# Patient Record
Sex: FEMALE | Race: WHITE | NOT HISPANIC OR LATINO | Employment: UNEMPLOYED | URBAN - METROPOLITAN AREA
[De-identification: names, ages, dates, MRNs, and addresses within clinical notes are randomized per-mention and may not be internally consistent; named-entity substitution may affect disease eponyms.]

---

## 2021-11-01 ENCOUNTER — OFFICE VISIT (OUTPATIENT)
Dept: INTERNAL MEDICINE | Facility: CLINIC | Age: 41
End: 2021-11-01
Payer: COMMERCIAL

## 2021-11-01 VITALS
SYSTOLIC BLOOD PRESSURE: 124 MMHG | TEMPERATURE: 98 F | OXYGEN SATURATION: 99 % | BODY MASS INDEX: 43.98 KG/M2 | HEIGHT: 62 IN | HEART RATE: 103 BPM | DIASTOLIC BLOOD PRESSURE: 78 MMHG | WEIGHT: 239 LBS

## 2021-11-01 DIAGNOSIS — Z13.220 SCREENING FOR LIPID DISORDERS: ICD-10-CM

## 2021-11-01 DIAGNOSIS — Z11.4 SCREENING FOR HIV WITHOUT PRESENCE OF RISK FACTORS: ICD-10-CM

## 2021-11-01 DIAGNOSIS — E88.819 INSULIN RESISTANCE: ICD-10-CM

## 2021-11-01 DIAGNOSIS — G47.33 OBSTRUCTIVE SLEEP APNEA SYNDROME: ICD-10-CM

## 2021-11-01 DIAGNOSIS — Z11.59 ENCOUNTER FOR HEPATITIS C SCREENING TEST FOR LOW RISK PATIENT: ICD-10-CM

## 2021-11-01 DIAGNOSIS — E66.01 CLASS 3 SEVERE OBESITY DUE TO EXCESS CALORIES WITH SERIOUS COMORBIDITY AND BODY MASS INDEX (BMI) OF 40.0 TO 44.9 IN ADULT: ICD-10-CM

## 2021-11-01 DIAGNOSIS — Z20.822 SUSPECTED COVID-19 VIRUS INFECTION: ICD-10-CM

## 2021-11-01 DIAGNOSIS — Z00.01 ENCOUNTER FOR WELL ADULT EXAM WITH ABNORMAL FINDINGS: Primary | ICD-10-CM

## 2021-11-01 DIAGNOSIS — Z13.1 SCREENING FOR DIABETES MELLITUS: ICD-10-CM

## 2021-11-01 DIAGNOSIS — R05.9 COUGH: ICD-10-CM

## 2021-11-01 PROBLEM — G47.10 HYPERSOMNIA: Status: ACTIVE | Noted: 2021-11-01

## 2021-11-01 LAB
CTP QC/QA: YES
SARS-COV-2 RDRP RESP QL NAA+PROBE: NEGATIVE

## 2021-11-01 PROCEDURE — 99999 PR PBB SHADOW E&M-NEW PATIENT-LVL III: CPT | Mod: PBBFAC,,, | Performed by: FAMILY MEDICINE

## 2021-11-01 PROCEDURE — U0002 COVID-19 LAB TEST NON-CDC: HCPCS | Mod: QW,S$GLB,, | Performed by: FAMILY MEDICINE

## 2021-11-01 PROCEDURE — 3074F PR MOST RECENT SYSTOLIC BLOOD PRESSURE < 130 MM HG: ICD-10-PCS | Mod: CPTII,S$GLB,, | Performed by: FAMILY MEDICINE

## 2021-11-01 PROCEDURE — 99386 PREV VISIT NEW AGE 40-64: CPT | Mod: S$GLB,,, | Performed by: FAMILY MEDICINE

## 2021-11-01 PROCEDURE — 1160F PR REVIEW ALL MEDS BY PRESCRIBER/CLIN PHARMACIST DOCUMENTED: ICD-10-PCS | Mod: CPTII,S$GLB,, | Performed by: FAMILY MEDICINE

## 2021-11-01 PROCEDURE — 3008F BODY MASS INDEX DOCD: CPT | Mod: CPTII,S$GLB,, | Performed by: FAMILY MEDICINE

## 2021-11-01 PROCEDURE — 99386 PR PREVENTIVE VISIT,NEW,40-64: ICD-10-PCS | Mod: S$GLB,,, | Performed by: FAMILY MEDICINE

## 2021-11-01 PROCEDURE — 99999 PR PBB SHADOW E&M-NEW PATIENT-LVL III: ICD-10-PCS | Mod: PBBFAC,,, | Performed by: FAMILY MEDICINE

## 2021-11-01 PROCEDURE — 1159F PR MEDICATION LIST DOCUMENTED IN MEDICAL RECORD: ICD-10-PCS | Mod: CPTII,S$GLB,, | Performed by: FAMILY MEDICINE

## 2021-11-01 PROCEDURE — 3074F SYST BP LT 130 MM HG: CPT | Mod: CPTII,S$GLB,, | Performed by: FAMILY MEDICINE

## 2021-11-01 PROCEDURE — 3078F PR MOST RECENT DIASTOLIC BLOOD PRESSURE < 80 MM HG: ICD-10-PCS | Mod: CPTII,S$GLB,, | Performed by: FAMILY MEDICINE

## 2021-11-01 PROCEDURE — 1160F RVW MEDS BY RX/DR IN RCRD: CPT | Mod: CPTII,S$GLB,, | Performed by: FAMILY MEDICINE

## 2021-11-01 PROCEDURE — 3008F PR BODY MASS INDEX (BMI) DOCUMENTED: ICD-10-PCS | Mod: CPTII,S$GLB,, | Performed by: FAMILY MEDICINE

## 2021-11-01 PROCEDURE — U0002: ICD-10-PCS | Mod: QW,S$GLB,, | Performed by: FAMILY MEDICINE

## 2021-11-01 PROCEDURE — 1159F MED LIST DOCD IN RCRD: CPT | Mod: CPTII,S$GLB,, | Performed by: FAMILY MEDICINE

## 2021-11-01 PROCEDURE — 3078F DIAST BP <80 MM HG: CPT | Mod: CPTII,S$GLB,, | Performed by: FAMILY MEDICINE

## 2021-11-01 RX ORDER — DIAZEPAM 2 MG/1
2 TABLET ORAL EVERY 8 HOURS PRN
COMMUNITY
Start: 2021-06-29 | End: 2022-08-02 | Stop reason: ALTCHOICE

## 2021-11-01 RX ORDER — MULTIVITAMIN
1 TABLET ORAL DAILY
COMMUNITY

## 2021-11-03 ENCOUNTER — TELEPHONE (OUTPATIENT)
Dept: PULMONOLOGY | Facility: CLINIC | Age: 41
End: 2021-11-03
Payer: COMMERCIAL

## 2021-11-10 ENCOUNTER — PATIENT MESSAGE (OUTPATIENT)
Dept: INTERNAL MEDICINE | Facility: CLINIC | Age: 41
End: 2021-11-10
Payer: COMMERCIAL

## 2021-11-11 ENCOUNTER — LAB VISIT (OUTPATIENT)
Dept: LAB | Facility: HOSPITAL | Age: 41
End: 2021-11-11
Attending: FAMILY MEDICINE
Payer: COMMERCIAL

## 2021-11-11 DIAGNOSIS — Z11.59 ENCOUNTER FOR HEPATITIS C SCREENING TEST FOR LOW RISK PATIENT: ICD-10-CM

## 2021-11-11 DIAGNOSIS — Z13.220 SCREENING FOR LIPID DISORDERS: ICD-10-CM

## 2021-11-11 DIAGNOSIS — Z00.01 ENCOUNTER FOR WELL ADULT EXAM WITH ABNORMAL FINDINGS: ICD-10-CM

## 2021-11-11 DIAGNOSIS — Z13.1 SCREENING FOR DIABETES MELLITUS: ICD-10-CM

## 2021-11-11 DIAGNOSIS — Z11.4 SCREENING FOR HIV WITHOUT PRESENCE OF RISK FACTORS: ICD-10-CM

## 2021-11-11 DIAGNOSIS — E66.01 CLASS 3 SEVERE OBESITY DUE TO EXCESS CALORIES WITH SERIOUS COMORBIDITY AND BODY MASS INDEX (BMI) OF 40.0 TO 44.9 IN ADULT: ICD-10-CM

## 2021-11-11 DIAGNOSIS — E88.819 INSULIN RESISTANCE: ICD-10-CM

## 2021-11-11 LAB
25(OH)D3+25(OH)D2 SERPL-MCNC: 21 NG/ML (ref 30–96)
ALBUMIN SERPL BCP-MCNC: 3.8 G/DL (ref 3.5–5.2)
ALP SERPL-CCNC: 70 U/L (ref 55–135)
ALT SERPL W/O P-5'-P-CCNC: 15 U/L (ref 10–44)
ANION GAP SERPL CALC-SCNC: 7 MMOL/L (ref 8–16)
AST SERPL-CCNC: 15 U/L (ref 10–40)
BILIRUB SERPL-MCNC: 0.6 MG/DL (ref 0.1–1)
BUN SERPL-MCNC: 9 MG/DL (ref 6–20)
CALCIUM SERPL-MCNC: 9.4 MG/DL (ref 8.7–10.5)
CHLORIDE SERPL-SCNC: 104 MMOL/L (ref 95–110)
CHOLEST SERPL-MCNC: 152 MG/DL (ref 120–199)
CHOLEST/HDLC SERPL: 4 {RATIO} (ref 2–5)
CO2 SERPL-SCNC: 26 MMOL/L (ref 23–29)
CREAT SERPL-MCNC: 0.7 MG/DL (ref 0.5–1.4)
ERYTHROCYTE [DISTWIDTH] IN BLOOD BY AUTOMATED COUNT: 12.4 % (ref 11.5–14.5)
EST. GFR  (AFRICAN AMERICAN): >60 ML/MIN/1.73 M^2
EST. GFR  (NON AFRICAN AMERICAN): >60 ML/MIN/1.73 M^2
ESTIMATED AVG GLUCOSE: 108 MG/DL (ref 68–131)
GLUCOSE SERPL-MCNC: 95 MG/DL (ref 70–110)
HBA1C MFR BLD: 5.4 % (ref 4–5.6)
HCT VFR BLD AUTO: 39.6 % (ref 37–48.5)
HDLC SERPL-MCNC: 38 MG/DL (ref 40–75)
HDLC SERPL: 25 % (ref 20–50)
HGB BLD-MCNC: 13 G/DL (ref 12–16)
LDLC SERPL CALC-MCNC: 101.2 MG/DL (ref 63–159)
MCH RBC QN AUTO: 30.2 PG (ref 27–31)
MCHC RBC AUTO-ENTMCNC: 32.8 G/DL (ref 32–36)
MCV RBC AUTO: 92 FL (ref 82–98)
NONHDLC SERPL-MCNC: 114 MG/DL
PLATELET # BLD AUTO: 282 K/UL (ref 150–450)
PMV BLD AUTO: 9.2 FL (ref 9.2–12.9)
POTASSIUM SERPL-SCNC: 3.9 MMOL/L (ref 3.5–5.1)
PROT SERPL-MCNC: 7.4 G/DL (ref 6–8.4)
RBC # BLD AUTO: 4.3 M/UL (ref 4–5.4)
SODIUM SERPL-SCNC: 137 MMOL/L (ref 136–145)
TRIGL SERPL-MCNC: 64 MG/DL (ref 30–150)
TSH SERPL DL<=0.005 MIU/L-ACNC: 1.98 UIU/ML (ref 0.4–4)
WBC # BLD AUTO: 5.15 K/UL (ref 3.9–12.7)

## 2021-11-11 PROCEDURE — 85027 COMPLETE CBC AUTOMATED: CPT | Performed by: FAMILY MEDICINE

## 2021-11-11 PROCEDURE — 83036 HEMOGLOBIN GLYCOSYLATED A1C: CPT | Performed by: FAMILY MEDICINE

## 2021-11-11 PROCEDURE — 84443 ASSAY THYROID STIM HORMONE: CPT | Performed by: FAMILY MEDICINE

## 2021-11-11 PROCEDURE — 80053 COMPREHEN METABOLIC PANEL: CPT | Performed by: FAMILY MEDICINE

## 2021-11-11 PROCEDURE — 82306 VITAMIN D 25 HYDROXY: CPT | Performed by: FAMILY MEDICINE

## 2021-11-11 PROCEDURE — 87389 HIV-1 AG W/HIV-1&-2 AB AG IA: CPT | Performed by: FAMILY MEDICINE

## 2021-11-11 PROCEDURE — 86803 HEPATITIS C AB TEST: CPT | Performed by: FAMILY MEDICINE

## 2021-11-11 PROCEDURE — 36415 COLL VENOUS BLD VENIPUNCTURE: CPT | Performed by: FAMILY MEDICINE

## 2021-11-11 PROCEDURE — 80061 LIPID PANEL: CPT | Performed by: FAMILY MEDICINE

## 2021-11-12 LAB
HCV AB SERPL QL IA: NEGATIVE
HIV 1+2 AB+HIV1 P24 AG SERPL QL IA: NEGATIVE

## 2021-12-18 PROBLEM — E55.9 VITAMIN D DEFICIENCY: Status: ACTIVE | Noted: 2021-12-18

## 2022-07-20 DIAGNOSIS — Z12.31 OTHER SCREENING MAMMOGRAM: ICD-10-CM

## 2022-08-02 ENCOUNTER — TELEPHONE (OUTPATIENT)
Dept: PULMONOLOGY | Facility: HOSPITAL | Age: 42
End: 2022-08-02
Payer: COMMERCIAL

## 2022-08-02 ENCOUNTER — OFFICE VISIT (OUTPATIENT)
Dept: INTERNAL MEDICINE | Facility: CLINIC | Age: 42
End: 2022-08-02
Payer: COMMERCIAL

## 2022-08-02 VITALS
TEMPERATURE: 98 F | HEIGHT: 62 IN | DIASTOLIC BLOOD PRESSURE: 80 MMHG | OXYGEN SATURATION: 99 % | BODY MASS INDEX: 45.03 KG/M2 | WEIGHT: 244.69 LBS | HEART RATE: 80 BPM | SYSTOLIC BLOOD PRESSURE: 130 MMHG

## 2022-08-02 DIAGNOSIS — G47.33 OBSTRUCTIVE SLEEP APNEA SYNDROME: Primary | Chronic | ICD-10-CM

## 2022-08-02 DIAGNOSIS — E66.01 CLASS 3 SEVERE OBESITY DUE TO EXCESS CALORIES WITH SERIOUS COMORBIDITY AND BODY MASS INDEX (BMI) OF 40.0 TO 44.9 IN ADULT: ICD-10-CM

## 2022-08-02 DIAGNOSIS — E88.819 INSULIN RESISTANCE: ICD-10-CM

## 2022-08-02 DIAGNOSIS — E55.9 VITAMIN D DEFICIENCY: ICD-10-CM

## 2022-08-02 DIAGNOSIS — F51.04 PSYCHOPHYSIOLOGIC INSOMNIA: ICD-10-CM

## 2022-08-02 DIAGNOSIS — F41.9 RECURRENT MILD MAJOR DEPRESSIVE DISORDER WITH ANXIETY: Chronic | ICD-10-CM

## 2022-08-02 DIAGNOSIS — F33.0 RECURRENT MILD MAJOR DEPRESSIVE DISORDER WITH ANXIETY: Chronic | ICD-10-CM

## 2022-08-02 PROCEDURE — 3075F PR MOST RECENT SYSTOLIC BLOOD PRESS GE 130-139MM HG: ICD-10-PCS | Mod: CPTII,S$GLB,, | Performed by: FAMILY MEDICINE

## 2022-08-02 PROCEDURE — 3079F PR MOST RECENT DIASTOLIC BLOOD PRESSURE 80-89 MM HG: ICD-10-PCS | Mod: CPTII,S$GLB,, | Performed by: FAMILY MEDICINE

## 2022-08-02 PROCEDURE — 99214 PR OFFICE/OUTPT VISIT, EST, LEVL IV, 30-39 MIN: ICD-10-PCS | Mod: S$GLB,,, | Performed by: FAMILY MEDICINE

## 2022-08-02 PROCEDURE — 99214 OFFICE O/P EST MOD 30 MIN: CPT | Mod: S$GLB,,, | Performed by: FAMILY MEDICINE

## 2022-08-02 PROCEDURE — 3008F BODY MASS INDEX DOCD: CPT | Mod: CPTII,S$GLB,, | Performed by: FAMILY MEDICINE

## 2022-08-02 PROCEDURE — 99999 PR PBB SHADOW E&M-EST. PATIENT-LVL V: CPT | Mod: PBBFAC,,, | Performed by: FAMILY MEDICINE

## 2022-08-02 PROCEDURE — 3075F SYST BP GE 130 - 139MM HG: CPT | Mod: CPTII,S$GLB,, | Performed by: FAMILY MEDICINE

## 2022-08-02 PROCEDURE — 99999 PR PBB SHADOW E&M-EST. PATIENT-LVL V: ICD-10-PCS | Mod: PBBFAC,,, | Performed by: FAMILY MEDICINE

## 2022-08-02 PROCEDURE — 3008F PR BODY MASS INDEX (BMI) DOCUMENTED: ICD-10-PCS | Mod: CPTII,S$GLB,, | Performed by: FAMILY MEDICINE

## 2022-08-02 PROCEDURE — 3079F DIAST BP 80-89 MM HG: CPT | Mod: CPTII,S$GLB,, | Performed by: FAMILY MEDICINE

## 2022-08-02 RX ORDER — MECLIZINE HYDROCHLORIDE 25 MG/1
25 TABLET ORAL 3 TIMES DAILY PRN
COMMUNITY
Start: 2022-06-27

## 2022-08-02 RX ORDER — BUPROPION HYDROCHLORIDE 150 MG/1
150 TABLET ORAL DAILY
Qty: 30 TABLET | Refills: 2 | Status: SHIPPED | OUTPATIENT
Start: 2022-08-02 | End: 2022-10-27 | Stop reason: DRUGHIGH

## 2022-08-02 RX ORDER — CLONAZEPAM 0.5 MG/1
.25-.5 TABLET ORAL 2 TIMES DAILY PRN
Qty: 45 TABLET | Refills: 2 | Status: SHIPPED | OUTPATIENT
Start: 2022-08-02 | End: 2022-10-27 | Stop reason: SDUPTHER

## 2022-08-02 NOTE — PROGRESS NOTES
OFFICE VISIT 8/2/22 11:00 AM CDT    CHIEF COMPLAINT: Follow-up    DIAGNOSES SPECIFICALLY EVALUATED AND TREATED THIS ENCOUNTER  1. Obstructive sleep apnea syndrome  - Home Sleep Studies; Future    2. Insulin resistance    3. Vitamin D deficiency    4. Recurrent mild major depressive disorder with anxiety  - buPROPion (WELLBUTRIN XL) 150 MG TB24 tablet; Take 1 tablet (150 mg total) by mouth once daily.  Dispense: 30 tablet; Refill: 2  - Ambulatory referral/consult to Psychology; Future  - clonazePAM (KLONOPIN) 0.5 MG tablet; Take 0.5-1 tablets (0.25-0.5 mg total) by mouth 2 (two) times daily as needed (for anxiety or insomnia). MAX 1.5 TABLETS PER DAY  Dispense: 45 tablet; Refill: 2    5. Class 3 (severe) obesity due to excess calories with serious comorbidity and body mass index (BMI) of 40.0 to 44.9 in adult  - Ambulatory referral/consult to [facility]; Future    6. Psychophysiologic insomnia  - Ambulatory referral/consult to Psychology; Future  - clonazePAM (KLONOPIN) 0.5 MG tablet; Take 0.5-1 tablets (0.25-0.5 mg total) by mouth 2 (two) times daily as needed (for anxiety or insomnia). MAX 1.5 TABLETS PER DAY  Dispense: 45 tablet; Refill: 2     --------------------------------------------------------------------------------   BRANDON FALK (MRN [id number], APPT: 8/2/22 11:00 AM CDT)  --------------------------------  Ready to check out. See orders.  Schedule virtual visit in mid-late September.  Schedule in-office appointment in November.  Highlight patient instructions on AVS.  Thanks.  --------------------------------------------------------------------------------     Follow up in about 2 months (around 10/2/2022) for re-evaluate problem(s) discussed today.     Problem List Items Addressed This Visit       Class 3 (severe) obesity due to excess calories with serious comorbidity and body mass index (BMI) of 40.0 to 44.9 in adult (Chronic)    Relevant Orders    Ambulatory referral/consult to  [facility]    Insulin resistance (Chronic)    Current Assessment & Plan     Lab Results   Component Value Date    HGBA1C 5.4 11/11/2021    GLU 95 11/11/2021   No results found for: GLUCFAST, NHHB9VI, LWHL8KM  No results found for: LABINSU          Recurrent mild major depressive disorder with anxiety (Chronic)    Relevant Medications    buPROPion (WELLBUTRIN XL) 150 MG TB24 tablet    clonazePAM (KLONOPIN) 0.5 MG tablet    Other Relevant Orders    Ambulatory referral/consult to Psychology    Vitamin D deficiency (Chronic)    Current Assessment & Plan     Lab Results   Component Value Date    MDBRQOEK57VQ 21 (L) 11/11/2021          MANJINDER (obstructive sleep apnea) - Primary    Current Assessment & Plan     Hasn't gotten HST. She reports it is cost-prohibitive.          Other Visit Diagnoses       Psychophysiologic insomnia        Relevant Medications    clonazePAM (KLONOPIN) 0.5 MG tablet    Other Relevant Orders    Ambulatory referral/consult to Psychology        Unless noted herein, any chronic conditions are represented as and appear compensated/controlled and stable, and no other significant complaints or concerns were reported.    PRESCRIPTION DRUG MANAGEMENT  Outpatient Medications Prior to Visit   Medication Sig Dispense Refill    meclizine (ANTIVERT) 25 mg tablet Take 25 mg by mouth 3 (three) times daily as needed.      multivitamin (THERAGRAN) per tablet Take 1 tablet by mouth once daily.      diazePAM (VALIUM) 2 MG tablet Take 2 mg by mouth every 8 (eight) hours as needed.       No facility-administered medications prior to visit.     Medications Discontinued During This Encounter   Medication Reason    diazePAM (VALIUM) 2 MG tablet Alternate therapy     Medications Ordered This Encounter   Medications    buPROPion (WELLBUTRIN XL) 150 MG TB24 tablet     Sig: Take 1 tablet (150 mg total) by mouth once daily.     Dispense:  30 tablet     Refill:  2    clonazePAM (KLONOPIN) 0.5 MG tablet     Sig:  "Take 0.5-1 tablets (0.25-0.5 mg total) by mouth 2 (two) times daily as needed (for anxiety or insomnia). MAX 1.5 TABLETS PER DAY     Dispense:  45 tablet     Refill:  2     PHYSICAL EXAM  Vitals:    08/02/22 1128   BP: 130/80   BP Location: Left arm   Patient Position: Sitting   BP Method: X-Large (Manual)   Pulse: 80   Temp: 98.1 °F (36.7 °C)   TempSrc: Tympanic   SpO2: 99%   Weight: 111 kg (244 lb 11.4 oz)   Height: 5' 2" (1.575 m)   CONSTITUTIONAL: Vital signs noted. Appears well.  PSYCH: Alert and conversant. Mood is grossly neutral. Affect appropriate.  PULM: Breathing unlabored.  HEART: Regular.     Documentation entered by me for this encounter may have been done in part using speech-recognition technology. Although I have made an effort to ensure accuracy, "sound like" errors may exist and should be interpreted in context.   "

## 2022-08-02 NOTE — PATIENT INSTRUCTIONS
"I have referred you to see one of [facility] excellent mental health specialists for counseling services.    You must act SOON and call [phone] to schedule your appointment.     Mental health specialists are in high demand, so here are some tips for scheduling a timely appointment.  I have seen appointments delayed because departmental staff and the patient fail to connect by phone, with the record showing numerous missed calls and missed call-backs. If you call and get the departmental voicemail, in your message ask them to contact you by [facility] Portal message to schedule your appointment (assuming you check your [facility] messages regularly). This will prevent the "phone-tag" that sometimes happens.  I encourage you to ask to be placed on the waiting list for canceled appointments because new appointments often open up a few days in advance when someone cancels or reschedules their appointment.  Contact your insurance company for a list of other reputable mental health specialists to see if you are able to get a more timely appointment with one of them.  Check PsychologyToday.com for listings and ratings for local mental health specialists.    Need Support Now?  If you or someone you know is struggling or in crisis, help is available. Call or text 988 or chat [employer].org  988 offers 24/7 access to trained crisis counselors who can help people experiencing mental health-related distress. That could be:  Thoughts of suicide  Mental health or substance use crisis, or  Any other kind of emotion distress.          Some categories of prescription drugs can be expensive. However, many manufacturers of brand-name drugs offer discounted or even free medicine for patients who qualify. You can visit https://www.needymeds.org to learn about any patient assistance programs available for your prescriptions.    If you find that you have difficulty affording your medicine, [facility] has a Pharmacy Assistance " Program that may be able to help. This is a free service that helps find ways to make medications affordable for patients, regardless of their insurance. Let me know if you would like me to send your prescription to [facility] and consult our Pharmacy Assistance Program.    Another tip: Check out the web site https://www.LatamLeap. There you can get discount coupons for prescription drugs and compare drug prices at different pharmacies. You can also download the [employer] sumit for your smart phone.       Vitamin D: Vitamin D is a fat-soluble vitamin required for normal growth of teeth and bones.  Your vitamin D level is moderately low I recommend taking a high-quality over-the-counter vitamin D3 supplement of 2000 IU daily.  After taking 2000 IU for 3 months, I recommend that you then decrease your dose to 1000 IU daily and continue taking 1000 IU daily indefinitely. [employer] is a brand of supplements that I have found trustworthy and reasonably priced. You can read more about their vitamin supplements at www.naturemade.[employer].         We want you to have a excellent experience with your upcoming virtual visit, so here are some tips:  Ensure that you have the latest version of the [facility] sumit installed if you use your mobile device for your virtual visit.  You can learn more about [facility] at [email or web address]. There you'll also find instructions and a short YouTube video.  Check-in for your virtual visit appointment 10-15 minutes early to allow time to troubleshoot any technical problems.  Call the [facility] Patient Support Team at [phone] if you need help getting ready for your virtual visit or checking in for a virtual visit.  We encourage you to use headphones or earbuds during the visit to make it easier to hear and be heard.  Remember that virtual visit appointments are like in-office appointments in that patients are seen in order of their appointment. If your  appointment is at the start of the morning clinic or the start of the afternoon clinic, you shouldn't experience much of a wait. However, if your appointment is at the end of the morning clinic or the end of the afternoon clinic, you may experience a wait if an unexpected need has caused our clinic to run behind schedule. We will notify you if your wait is expected to be longer than usual. While waiting, please don't exit or abandon your virtual visit, as this may cause you to have to reschedule your appointment.  REMEMBER: The law requires that:  You must be in the state of [place] or [place] at the time of your virtual visit; and  You cannot be driving during your virtual visit. If you are driving when your provider joins you in the virtual visit, your appointment may have to be postponed or rescheduled.

## 2022-08-02 NOTE — ASSESSMENT & PLAN NOTE
Lab Results   Component Value Date    HGBA1C 5.4 11/11/2021    GLU 95 11/11/2021     No results found for: GLUCFAST, GPKV2WH, ICWQ9OY  No results found for: LABINSU

## 2022-08-03 ENCOUNTER — PATIENT MESSAGE (OUTPATIENT)
Dept: PSYCHIATRY | Facility: CLINIC | Age: 42
End: 2022-08-03
Payer: COMMERCIAL

## 2022-08-05 ENCOUNTER — TELEPHONE (OUTPATIENT)
Dept: PSYCHIATRY | Facility: CLINIC | Age: 42
End: 2022-08-05
Payer: COMMERCIAL

## 2022-08-11 ENCOUNTER — OFFICE VISIT (OUTPATIENT)
Dept: PRIMARY CARE CLINIC | Facility: CLINIC | Age: 42
End: 2022-08-11
Payer: COMMERCIAL

## 2022-08-11 DIAGNOSIS — F33.0 RECURRENT MILD MAJOR DEPRESSIVE DISORDER WITH ANXIETY: Chronic | ICD-10-CM

## 2022-08-11 DIAGNOSIS — E66.01 CLASS 3 SEVERE OBESITY DUE TO EXCESS CALORIES WITH SERIOUS COMORBIDITY AND BODY MASS INDEX (BMI) OF 40.0 TO 44.9 IN ADULT: Chronic | ICD-10-CM

## 2022-08-11 DIAGNOSIS — E88.819 INSULIN RESISTANCE: Primary | Chronic | ICD-10-CM

## 2022-08-11 DIAGNOSIS — G47.33 OBSTRUCTIVE SLEEP APNEA SYNDROME: Chronic | ICD-10-CM

## 2022-08-11 DIAGNOSIS — F41.9 RECURRENT MILD MAJOR DEPRESSIVE DISORDER WITH ANXIETY: Chronic | ICD-10-CM

## 2022-08-11 PROCEDURE — 99215 PR OFFICE/OUTPT VISIT, EST, LEVL V, 40-54 MIN: ICD-10-PCS | Mod: 95,,, | Performed by: PHYSICIAN ASSISTANT

## 2022-08-11 PROCEDURE — 99215 OFFICE O/P EST HI 40 MIN: CPT | Mod: 95,,, | Performed by: PHYSICIAN ASSISTANT

## 2022-08-11 NOTE — PROGRESS NOTES
Subjective:       Patient ID: Gina Adhikari is a 42 y.o. female.    HPI     The patient location is: home   The chief complaint leading to consultation is: weight, mood, symptoms     Visit type: audiovisual    Face to Face time with patient: 90 mins   90 minutes of total time spent on the encounter, which includes face to face time and non-face to face time preparing to see the patient (eg, review of tests), Obtaining and/or reviewing separately obtained history, Documenting clinical information in the electronic or other health record, Independently interpreting results (not separately reported) and communicating results to the patient/family/caregiver, or Care coordination (not separately reported).         Each patient to whom he or she provides medical services by telemedicine is:  (1) informed of the relationship between the physician and patient and the respective role of any other health care provider with respect to management of the patient; and (2) notified that he or she may decline to receive medical services by telemedicine and may withdraw from such care at any time.    Notes:     Lifestyle related medical issues:     Recently seen by PCP   Has depression, anxiety, OA, weight         Past Medical History:   Diagnosis Date    Anxiety     Arthritis     Class 3 (severe) obesity due to excess calories with serious comorbidity and body mass index (BMI) of 40.0 to 44.9 in adult 2021    Depression        Past Surgical History:   Procedure Laterality Date     SECTION      GALLBLADDER SURGERY  2009     Family History   Problem Relation Age of Onset    Diabetes Mother     Hypertension Mother     Cancer Mother     Cancer Father     Asthma Maternal Grandmother          Physical activity: low   Diet:     Cooks at home- but hasn't lately   Red beans/rice   Comfort type foods  Snacks a good bit  Emotional eater       Sleep:   12am   Gets up at 430am, coffee     Stress: low, more  depression   Overall wellbeing: good   Social support: good   Barriers to goals: mood/fatigue         Wt Readings from Last 3 Encounters:   08/02/22 111 kg (244 lb 11.4 oz)   07/25/22 111.6 kg (246 lb)   11/01/21 108.4 kg (238 lb 15.7 oz)       Current stage of change contemplation   Next stage of change prep       Current Outpatient Medications   Medication Instructions    buPROPion (WELLBUTRIN XL) 150 mg, Oral, Daily    clonazePAM (KLONOPIN) 0.25-0.5 mg, Oral, 2 times daily PRN, MAX 1.5 TABLETS PER DAY    meclizine (ANTIVERT) 25 mg, Oral, 3 times daily PRN    multivitamin (THERAGRAN) per tablet 1 tablet, Oral, Daily            Review of Systems   Constitutional: Positive for activity change and fatigue. Negative for fever and unexpected weight change.   HENT: Negative for sore throat and trouble swallowing.    Respiratory: Negative for cough and shortness of breath.    Cardiovascular: Negative for chest pain.   Gastrointestinal: Negative for abdominal pain.   Hematological: Negative for adenopathy. Does not bruise/bleed easily.   Psychiatric/Behavioral: Positive for dysphoric mood.   All other systems reviewed and are negative.        Brain fog       Objective:   LMP  (LMP Unknown) Comment: Mirena     Physical Exam  Constitutional:       Appearance: Normal appearance. She is obese.   HENT:      Head: Normocephalic and atraumatic.   Neurological:      General: No focal deficit present.      Mental Status: She is alert.   Psychiatric:         Mood and Affect: Mood normal.           Lab Results   Component Value Date    WBC 5.15 11/11/2021    HGB 13.0 11/11/2021    HCT 39.6 11/11/2021     11/11/2021    CHOL 152 11/11/2021    TRIG 64 11/11/2021    HDL 38 (L) 11/11/2021    ALT 15 11/11/2021    AST 15 11/11/2021     11/11/2021    K 3.9 11/11/2021     11/11/2021    CREATININE 0.7 11/11/2021    BUN 9 11/11/2021    CO2 26 11/11/2021    TSH 1.982 11/11/2021    HGBA1C 5.4 11/11/2021       Assessment:        1. Insulin resistance    2. Class 3 (severe) obesity due to excess calories with serious comorbidity and body mass index (BMI) of 40.0 to 44.9 in adult    3. Recurrent mild major depressive disorder with anxiety    4. Obstructive sleep apnea syndrome        Plan:   Insulin resistance    Class 3 (severe) obesity due to excess calories with serious comorbidity and body mass index (BMI) of 40.0 to 44.9 in adult    Recurrent mild major depressive disorder with anxiety    Obstructive sleep apnea syndrome           4 week  goal:      Physical Activity  - 10 mins a day- start an alarm and do any PA for that time.  This helps combat the depressive symtpoms    Diet  -  Clean up.  Add more water, cut out processed snacking x 1 week   Nuts, seeds, whole grans/ fruits/veggies/ lean proteins    Sleep -  Sleep hygiene reviewed. No screen time 1 hr before bed    apps suggested      Stress - as above, CBT   Time spent: 60 minutes in face to face and with review prior and after of chart notes from specialists, in regards to diagnosis, prognosis, review of lab and test results, benefits of treatment as well as management of disease, counseling of patient and coordination of care between various health care providers .

## 2022-08-11 NOTE — PATIENT INSTRUCTIONS
"        PRODUCE  [] All fresh fruit   [] All fresh vegetables   [] All fresh herbs  [] All herb purees + pastes  [] Pre-spiralized vegetable noodles   [] Steam-In-The-Bag begetables  [] Riced cauliflower  [] Jicama sticks  [] Love Beets  all varieties  [] Wholly Guacamole  all varieties  [] Hummus  all varieties, chickpea + vegetable  [] Tofu Shirataki noodles    [] Tofu  all varieties  [] Tempeh  all varieties    PROTEIN  CHICKEN   [] Boneless, skinless breasts  [] Boneless, skinless thighs  [] Ground chicken breast, at least 93% lean  [] Chicken breast cutlet  [] Aidell's  Chicken Sausage + Chicken Meatballs    TURKEY   [] Turkey breast tenderloin   [] Ground turkey breast, at least 93% lean  [] [place] Naturals  Turkey Sausage    BEEF  [] Tenderloin  [] Sirloin  [] Top Loin  [] Flank Steak  [] Round Steak  [] Filet  [] Lean ground beef, at least 93% lean + grass-fed preferable    PORK  [] Tenderloin  [] Pork Chop  [] Center Cut  [] Hermes Naturals  No-Sugar Goff    BISON  [] Lefors  90 - 95% lean    SEAFOOD  [] All fresh fish + seafood; locally sourced when possible  [] Smoked salmon    HEAT + EAT ENTREES   [] Harrison's Natural Foods  Chicken, Pork, Beef  [] Jason  "All Natural" Grilled Chicken Breast + Strips, all varieties    SAUCES SPREADS + DIPS  [] Bitchin Sauce  Original, Chipotle, Cilantro Culver  [] Heidi's Kitchen  Tzatziki Yogurt Dip, Babaganoush, Hummus  [] Wholly Guacamole  all varieties  [] Hummus  all varieties  [] [place] Gringo Salsa  all varieties  [] Mrs. Umair's Salsa  all varieties  [] Stubb's All Natural BBQ Sauce  [] Primal Kitchen  Kim, Ketchup, BBQ Sauce  [] Primal Kitchen Pasta Sauce  Roasted Garlic, Tomato Basil, no-dairy Vodka Sauce  [] Sal & Arminda's  HeartSmart Pasta Sauce    DAIRY/DAIRY SUBSTITUTES/EGGS  EGGS   [] All eggs  cage-free, pasture-raise preferable  [] Nahuni  egg wraps  [] Vital Farms  Pasture-Raised Egg Bites  [] JUST Egg [vegan] "     CREAMERS   [] Califia  Better Half, original + vanilla unsweetened  [] NutPods  all varieties    MILK   [] Horizon Organic  all varieties except chocolate  [] Organic Valley  all varieties except chocolate  [] Organic Valley  ultra-filtered, reduced fat milk     PLANT_BASED MILK ALTERNATIVES  [] All unsweetened almond milks  original, vanilla + chocolate  [] Ripple  unsweetened   [] Milkadamia  original +_ vanilla, unsweetened   [] Forager  original + vanilla, unsweetened   [] Silk Organic  soy milk, unsweetened  [] Oatly  unsweetened  [] Califia  regular + protein-fortified oat milk, unsweetened     CHEESES  [] Regular or reduced fat cheeses  [] BelGioso  Fresh Mozzarella Snack Packs, Parmesan Power-full Snack   [] Goat cheese  [] Fresh mozzarella  [] String cheese  all varieties  [] Deyanira Cottage Cheese  [] Genesis's Cultured Cottage Cheese  [] Myra Life 'Just Like Mozzarella'  plant-based shreds and other varieties  [] Parmela Creamery  plant-based shredded cheese    YOGURT  [] Fage  2% low-fat, plain  [] Siggi's  plain, vanilla  [] Chobani Greek  nonfat + whole milk yogurt, plain   [] Chobani Less Sugar  all flavored varieties   [] Oikos Greek  nonfat, plain  [] Two Good  all varieties   [] [facility] Provisions  plain  [] [place] Organic  low-fat + nonfat, plain  [] [facility]  goat milk yogurt, plain  [] Kefit  unsweetened, plain  [] Forager  Greek style unsweetened, plain [dairy-free]  [] [place]  unsweetened Greek style, plain [dairy-free]  [] [facility]  almond milk yogurt, vanilla or plain, unsweetened [dairy-free]    FREEZER SECTION  FROZEN VEGGIES  [] All plain frozen veggies + greens [e.g. broccoli, brussels, carrots, okra, mushrooms, zucchini, yellow squash, butternut squash, kale, spinach, sandra greens]  [] Riced veggies [e.g. cauliflower, broccoli, butternut squash]  [] Edamame  all varieties  [] Green Giant  [] Veggie Spirals  [] Marinated Veggies [e.g.  eggplant, peppers, zucchini]  [] Simply Steam [place] Sprouts  [] Birds Eye  [] Power Blend Italian Style  lentils, broccoli, kale, zucchini  []  [place] Sprouts & Carrots  [] Oven Roasters Broccoli & Cauliflower  [] [place] Blend  [] Tattooed   [] Green Bean Blend  [] Farmer's Market Ratatouille  [] Butter Balsamic Glazed Vegetables  [] Riced Cauliflower & Quinoa Mediterranean Style  [] Cristiane's Good Life  Southern Style Greens [sauteed kale + onion]    FROZEN FRUITS  [] All unsweetened frozen fruits  all varieties  [] Dole Fruits & Veggie Blends  Berries 'n Kale  [] Dole Mix-ins  Triple Berry     FROZEN ENTREES  [] The Good Kitchen meals  all varieties [ e.g. Chili Lime Chicken Over Riced Cauliflower]  [] Premium Paleo  Not Emil Momma's Meatloaf  [] Primal Kitchen  Chicken Pesto + Steak Fajitas w/ Peppers & Onions  [] Eating Well Frozen Entrees  Butter Chicken Masala, Steak Carne Asada, Creamy Pesto Chicken, Chicken + Wild Rice Stroganoff, Yellow Riggs Chicken, Sun-dried Tomato Chicken, Chicken Lo Mein  [] Realgood Entree Bowls  Amharic Inspired Beef Bowl over Riced Cauliflower, Chicken Burrito Bowl   [] Great Karma Coconut Riggs  [] Keya's  [place] Nona with Black Beans, Vegetable Lasagna  [] Kashi Mayan [place] Bake  [] Healthy Choice  Simply Steamers Chicken Fried Rice  [] Basil Pesto Chicken & Mickey Style Pork Power Bowls  [] Tattooed   Enchilada Bowl  [] Loco Farms  Spicy Black Bean Burgers    FROZEN PIZZAS  [] Cauli'flour Foods  Cauliflower Pizza Crusts  [] Outer Aisle  Cauliflower Crust  [] Keya's  Veggie Crust Cheese Pizza  [] Quest Pizza     VEGETARIAN PRODUCTS  [] Beyond Meat  ground 'meat' + grilled 'chicken' strips  [] Tofurkey  Original Italian Sausage + Original Tempeh  [] Gardein  Beefless Ground + Meatless Meatballs  [] Loco Farms Grillers  Original Burger, Crumbles, Meatballs    ICE CREAMS + FROZEN DESSERTS  [] Halo Top  regular + keto  series, pops  [] Rebel  ice cream + dessert sandwiches  [] Enlightened  ice cream + bars  [] Nightfood  ice cream  [] Realgood  ice cream  [] Arctic Zero Fit  frozen pint  [] The Frozen Farmer  sorbets  [] Wholly Rollies  Protein Balls, all varieties  [] Dream Pops  Coconut Latte    FROZEN BREAKFASTS  [] Realgood  Breakfast Sandwiches on Cauliflower Cheesy Bread  [] Rebel  ice cream + dessert sandwiches  [] Enlightened  ice cream + bars  [] Nightfood  ice cream  [] Realgood  ice cream  [] Arctic Zero Fit  frozen pint  [] The Frozen Farmer  sorbets  [] Wholly Rollies  Protein Balls, all varieties  [] Dream Pops  Coconut Latte    BREADS/BUNS/WRAPS  [] Shiv Bread: All Types - In Freezer Section   [] Flat Out Light Wraps - All Varieties   [] Flat Out Protein Up Carb Down Flat Bread   [] Kontos Whole Wheat Pocket Kenyatta   [] Carrington CARROLL 100% Whole Wheat Tortillas   [] LaTortilla Factory Tortillas - Smart & Delicious; 50 or 80-calorie   [] Nature's Own 100% Whole Wheat Bread   [] Orowheat Healthful - 100% Whole Wheat Slice Bread and [place] Thins   [] Orowheat Healthful - Whole Wheat Nuts & Grain Bread; Flax & Seed Bread   [] Pepperidge Farm Natural Whole Grain 15 Bread   [] Pepperidge Farm Natural Whole Grain English Muffin - 100% Whole Wheat   [] Pepperidge Farm Very Thin 100% Whole Wheat   [] Shawna Robertson 45 Calories and Delightful   [] Ceasar' 100% Whole Wheat Thin-Sliced Bagels and English Muffins   [] Western Bagel: Perfect 10     GLUTEN FREE  [] Ryan's Gluten Free Bread   [] [place] Bakehouse 7-Grain Gluten Free Bread     LEGUME PASTA   [] Explore Asian Organic Black Bean Spaghetti   [] Modern Table   [] Tolerant Foods       NUT BUTTERS & JELLIES    NUT BUTTERS   [] Better'n Chocolate: Coconut Chocolate Peanut Butter Spread   [] Better'n Peanut Butter - All Types   [] Earth Balance Coconut and Peanut Spread   [] Joao's Nut Ignacio   [] MaraNatha: All Natural Roasted Cashew Butter - [place] or Creamy    [] MaraNatha: Roasted Peanut Butter   [] Nuts 'N More Peanut [place] - All Flavors   [] PB2 Powder - Original or Chocolate   [] Skippy Natural - Creamy, Super Chunk   [] Smart Balance Peanut Butter - [place] or Creamy   [] Peanut Butter & Company:   [] Smooth , Crunch Time, The Heat Is On, Old Fashioned Smooth, Mighty Nut- Powdered Peanut Butter, Squeeze Pack   [] Smucker's Natural Peanut Butter - [place] or Creamy   [] Sunbutter Nut Butter   [] Wild Friends Protein Peanut Butter/[place] o Butter - Vanilla or Chocolate     JELLIES  o Polaner's All Fruit   o Clearly Organic Best Choice: Strawberry Fruit Spread       SNACKS    BARS  [] Kashi Bars - Chewy or Crunchy; Honey [place] o Flax or Peanut Butter   [] KIND Bars - 5 Grams of Sugar or Less   [] KIND Protein Bars - Strong and KIND   [] [facility] Protein Bar - All Varieties   [] Nature Valley Roasted Nut Crunch - [place] Crunch; Peanut Crunch   [] [facility] Simple Nut Bar - Roasted Peanut & Honey   [] [facility] Simple Nut Bar - [place], Cashew & Sea Salt   [] [facility] Nut Ciales Bar - Salted Caramel Peanut   [] Think Thin Protein Bars   [] Quest Bars, Power Crunch Bars, Pure Protein Bars     BEEF JERKY - NITRATE FREE   [] Game On   [] Grass Run Farms   [] Krave   [] Ostrim   [] Perky Jerky   [] Primal Strips Meatless Vegan Jerky   [] [place]     CHIPS   [] Beanitos Chips   [] Fruit Crisps - e.g. Brother's-All-Natural, Bare Fruit, Yoga Chips   [] Telma's Soy Crisps: 1.3 ounce bag   [] Quest Protein Chips   [] Wasa Whole Wheat Crisp Bread     CRACKERS  [] Marni's Gone Crackers   [] Nabisco Triscuit: Regular and Thin Crisp Crackers   [] Vans Say Cheese Crackers (G-F)     POPCORN/NUTS   [] Rodrigo Bowser's Smart Pop Popcorn - Single Serving   [] 100-Calorie Pack of Nuts - All Varieties     PROTEIN POWDERS & DRINKS  []  Protein -  Whey Protein Powder   [] Garden of Life Raw Protein Powder   [] Iconic Ready-To-Drink Protein Drink    [] Valverde One Protein Powder   [] VegaSport Protein Powder     SALSA/HUMMUS/DIPS   [] Eat Well Embrace Life: Zesty Sriracha Carrot o Hummus   [] Pre-Portioned Guacamole Packs   [] Mathew's   [] Tostitos Restaurant Style Salsa       SOUPS   [] Keya's Organic Soups - Lentil, Vegetable, Split Pea, Low-Sodium     CANNED GOODS   [] 100% Pure Pumpkin   [] BlueRunner Creole Cream-Style Red Beans or Navy Beans   [] Cajun Power Chicken Gumbo Base   [] Chicken of the Sea [place]   [] Myah Fresh Cut Sliced Beets   [] Hormel Breast of Chicken in Water   [] LeSuer Tender Baby Whole Carrots   [] Vickie Tabasco Savanna Starter   [] StarKist: Chunk Lite Tuna in Water, Gourmet Select Pouches   [] StarKist: Yellowfin Tuna Fillets   [] Trappey's: Kidney, Butter, Jaramillo, Black Eye, Field, and Black Beans   [] ASHLEY Bautista's Turnip Greens or Dylon Spinach     CONDIMENTS/ SAUCES/SPREADS/ SPICES  [] Manny Vera's Magic Seasonings - Regular or Salt Free   [] Jose E De Dios's Sauces - All Flavors   [] Laughing Cow Light - All Flavors   [] Dash Salt-Free Marinade - All Flavors   [] Gareth & Arminda's: Heart Smart Pasta Sauces   [] Tabasco     SALAD DRESSINGS  [] Tami's Naturals: Lite Honey Mustard   [] Teodoro's Own: Lighten Up Salad Dressing - All Varieties   [] OPA Greek Yogurt Dressings - Ranch, Blue o Cheese, Caesar, Feta Dill     SWEETENERS  [] Sweet [place] Flats Sweetener   [] Swerve   [] Truvia     BEVERAGES  [] Coconut Water   [] Crystal Light PURE - All Flavors   [] Honest Tea: Just Green Tea, Unsweetened   [] Kombucha Tea   [] [place]   [] [facility] Bloody Marni Mix   [] Metromint - Zero-Sugar; All Natural Flavored   [] Priscilla - Plain or Flavored   [] Michael Sánchez   [] Steaz - Zero-Sugar, All-Natural, Sparkling Tea   [] Tea Bags: Any Brand - e.g. Per, Yogi, Tazzo, Celestial   [] V8 100% Vegetable Juice   [] Vitamin Water Zero   [] Water   [] Zevia - Stevia Sweetened Soft Drink     BEER/CHAPARRO/LIQUORS  []Jason's Premier  Light 64 Calories   [] Bud Select - 55 Calories   [] [place] Sisters Bloody Marni Mix   [] Price Genuine Draft - 64 Calories   [] Red or White Wine - All Varieties     CEREALS: HOT/COLD   [] [place]'s Puffin's Original Cereal  [] Koffi's Mill Oat Bran Hot Cereal - Cracked Wheat, Multi-Grain  [] Kashi GoLean Cereal  [] Kashi GoLean Hot Cereal packets - Vanilla; Honey Cinnamon  [] Annmarie's Special K Protein Cereal  [] Christopher's Steel Cut Carmen Oatmeal  [] Nature's Path Smart Bran  [] Moravian Instant Oatmeal packet, Original  [] Moravian Old Fashioned [place] Oats  [] Uncle Julio's Whole Wheat & Flaxseed Original Cereal     Vit D,   Calcium. Zinc, magnesium - take around bedtime     Water     Sleep hygiene

## 2022-08-19 ENCOUNTER — TELEPHONE (OUTPATIENT)
Dept: SLEEP MEDICINE | Facility: HOSPITAL | Age: 42
End: 2022-08-19
Payer: COMMERCIAL

## 2022-08-27 PROCEDURE — 95800 PR SLEEP STUDY, UNATTENDED, RECORD HEART RATE/O2 SAT/RESP ANAL/SLEEP TIME: ICD-10-PCS | Mod: 26,,, | Performed by: INTERNAL MEDICINE

## 2022-08-27 PROCEDURE — 95800 SLP STDY UNATTENDED: CPT | Mod: 26,,, | Performed by: INTERNAL MEDICINE

## 2022-08-29 ENCOUNTER — PROCEDURE VISIT (OUTPATIENT)
Dept: SLEEP MEDICINE | Facility: CLINIC | Age: 42
End: 2022-08-29
Payer: COMMERCIAL

## 2022-08-29 ENCOUNTER — PATIENT MESSAGE (OUTPATIENT)
Dept: INTERNAL MEDICINE | Facility: CLINIC | Age: 42
End: 2022-08-29
Payer: COMMERCIAL

## 2022-08-29 DIAGNOSIS — G47.33 OBSTRUCTIVE SLEEP APNEA SYNDROME: Chronic | ICD-10-CM

## 2022-08-29 PROCEDURE — 95800 SLP STDY UNATTENDED: CPT

## 2022-08-29 NOTE — PROCEDURES
Home Sleep Studies    Date/Time: 8/29/2022 8:00 AM  Performed by: Vicente Spring MD  Authorized by: KWAKU Perez MD     PHYSICIAN INTERPRETATION AND COMMENTS: Findings are consistent with mild, positional obstructive sleep apnea(MANJINDER).  Therapy with CPAP indicated, Please refer to sleep disorders clinic.  CLINICAL HISTORY: 42 year old female presented with: 15.75 inch neck, BMI of 45, an [place] sleepiness score of 19, history  of depression and symptoms of nocturnal snoring, waking up choking and witnessed apneas. Based on the clinical  history, the patient has a high pre-test probability of having Severe MANJINDER.  SLEEP STUDY FINDINGS: Patient underwent a 1 night Home Sleep Test and by behavioral criteria, slept for approximately  5.21 hours, with a sleep latency of 3 minutes and a sleep efficiency of 74.4%. Mild sleep disordered breathing (AHI=10) is  noted based on a 4% hypopnea desaturation criteria. The patient slept supine 11.8% of the night based on valid recording  time of 5.21 hours and is 1.8 times as likely to have apneas/hypopneas when supine. When considering more subtle  measures of sleep disordered breathing, the overall respiratory disturbance index is mild(RDI=17) based on a 1% hypopnea  desaturation criteria with confirmation by surrogate arousal indicators, predominantly in the supine position (31  events/hour). The apneas/hypopneas are accompanied by minimal oxygen desaturation (percent time below 90% SpO2:  0.1%, Min SpO2: 89.1%). The average desaturation across all sleep disordered breathing events is 3.2%. Snoring occurs for  48.6% (30 dB) of the study, 48.5% is extremely loud. The mean pulse rate is 68.9 BPM, with very frequent pulse rate  variability (71 events with >= 6 BPM increase/decrease per hour).  TREATMENT CONSIDERATIONS: Consider nasal continuous positive airway pressure (CPAP/AutoPAP) as the initial  treatment choice based on the AHI severity, daytime somnolence and  co-morbidities. A mandibular advancement splint  (MAS) or referral to an ENT surgeon for modification to the airway should be considered to reduce daytime somnolence  and the potential contribution of MANJINDER on existing diseases if the patient prefers an alternative therapy or the CPAP trial is  unsuccessful. Based on the MANJINDER Supine data in the study, a Mandibular Advancement Splint (MAS) will likely provide  treatment benefit independent of MANJINDER severity. The patient should avoid sleeping supine; the non-supine RDI is 1.9 times  less severe than the supine RDI.

## 2022-08-30 DIAGNOSIS — G47.33 OBSTRUCTIVE SLEEP APNEA SYNDROME: Primary | Chronic | ICD-10-CM

## 2022-08-30 NOTE — PROGRESS NOTES
I reported HST results to patient. (See Patient Portal Comment.)    I entered order for referral to sleep clinic.

## 2022-08-31 ENCOUNTER — PATIENT MESSAGE (OUTPATIENT)
Dept: PULMONOLOGY | Facility: CLINIC | Age: 42
End: 2022-08-31
Payer: COMMERCIAL

## 2022-09-01 ENCOUNTER — PATIENT MESSAGE (OUTPATIENT)
Dept: PULMONOLOGY | Facility: CLINIC | Age: 42
End: 2022-09-01
Payer: COMMERCIAL

## 2022-09-01 ENCOUNTER — TELEPHONE (OUTPATIENT)
Dept: PULMONOLOGY | Facility: CLINIC | Age: 42
End: 2022-09-01
Payer: COMMERCIAL

## 2022-09-01 NOTE — TELEPHONE ENCOUNTER
----- Message from Vicente Spring MD sent at 8/30/2022  9:01 AM CDT -----  Needs appt  ----- Message -----  From: KWAKU Perez MD  Sent: 8/30/2022   7:57 AM CDT  To: Vicente Spring MD, #    I reported HST results to patient. (See Patient Portal Comment.)    I entered order for referral to sleep clinic.

## 2022-09-12 ENCOUNTER — PATIENT MESSAGE (OUTPATIENT)
Dept: PULMONOLOGY | Facility: CLINIC | Age: 42
End: 2022-09-12
Payer: COMMERCIAL

## 2022-09-27 ENCOUNTER — OFFICE VISIT (OUTPATIENT)
Dept: SLEEP MEDICINE | Facility: CLINIC | Age: 42
End: 2022-09-27
Payer: COMMERCIAL

## 2022-09-27 VITALS
WEIGHT: 246.5 LBS | BODY MASS INDEX: 45.36 KG/M2 | SYSTOLIC BLOOD PRESSURE: 110 MMHG | DIASTOLIC BLOOD PRESSURE: 74 MMHG | HEART RATE: 88 BPM | OXYGEN SATURATION: 99 % | RESPIRATION RATE: 18 BRPM | HEIGHT: 62 IN

## 2022-09-27 DIAGNOSIS — E66.01 MORBID OBESITY WITH BMI OF 45.0-49.9, ADULT: ICD-10-CM

## 2022-09-27 DIAGNOSIS — Z72.820 LACK OF ADEQUATE SLEEP: ICD-10-CM

## 2022-09-27 DIAGNOSIS — G47.00 INSOMNIA, UNSPECIFIED TYPE: ICD-10-CM

## 2022-09-27 DIAGNOSIS — G47.33 OSA (OBSTRUCTIVE SLEEP APNEA): Primary | ICD-10-CM

## 2022-09-27 PROCEDURE — 3074F PR MOST RECENT SYSTOLIC BLOOD PRESSURE < 130 MM HG: ICD-10-PCS | Mod: CPTII,S$GLB,, | Performed by: NURSE PRACTITIONER

## 2022-09-27 PROCEDURE — 1159F MED LIST DOCD IN RCRD: CPT | Mod: CPTII,S$GLB,, | Performed by: NURSE PRACTITIONER

## 2022-09-27 PROCEDURE — 1160F PR REVIEW ALL MEDS BY PRESCRIBER/CLIN PHARMACIST DOCUMENTED: ICD-10-PCS | Mod: CPTII,S$GLB,, | Performed by: NURSE PRACTITIONER

## 2022-09-27 PROCEDURE — 99999 PR PBB SHADOW E&M-EST. PATIENT-LVL III: ICD-10-PCS | Mod: PBBFAC,,, | Performed by: NURSE PRACTITIONER

## 2022-09-27 PROCEDURE — 3078F DIAST BP <80 MM HG: CPT | Mod: CPTII,S$GLB,, | Performed by: NURSE PRACTITIONER

## 2022-09-27 PROCEDURE — 99204 PR OFFICE/OUTPT VISIT, NEW, LEVL IV, 45-59 MIN: ICD-10-PCS | Mod: S$GLB,,, | Performed by: NURSE PRACTITIONER

## 2022-09-27 PROCEDURE — 3008F PR BODY MASS INDEX (BMI) DOCUMENTED: ICD-10-PCS | Mod: CPTII,S$GLB,, | Performed by: NURSE PRACTITIONER

## 2022-09-27 PROCEDURE — 99999 PR PBB SHADOW E&M-EST. PATIENT-LVL III: CPT | Mod: PBBFAC,,, | Performed by: NURSE PRACTITIONER

## 2022-09-27 PROCEDURE — 1160F RVW MEDS BY RX/DR IN RCRD: CPT | Mod: CPTII,S$GLB,, | Performed by: NURSE PRACTITIONER

## 2022-09-27 PROCEDURE — 3074F SYST BP LT 130 MM HG: CPT | Mod: CPTII,S$GLB,, | Performed by: NURSE PRACTITIONER

## 2022-09-27 PROCEDURE — 1159F PR MEDICATION LIST DOCUMENTED IN MEDICAL RECORD: ICD-10-PCS | Mod: CPTII,S$GLB,, | Performed by: NURSE PRACTITIONER

## 2022-09-27 PROCEDURE — 99204 OFFICE O/P NEW MOD 45 MIN: CPT | Mod: S$GLB,,, | Performed by: NURSE PRACTITIONER

## 2022-09-27 PROCEDURE — 3008F BODY MASS INDEX DOCD: CPT | Mod: CPTII,S$GLB,, | Performed by: NURSE PRACTITIONER

## 2022-09-27 PROCEDURE — 3078F PR MOST RECENT DIASTOLIC BLOOD PRESSURE < 80 MM HG: ICD-10-PCS | Mod: CPTII,S$GLB,, | Performed by: NURSE PRACTITIONER

## 2022-09-27 NOTE — PATIENT INSTRUCTIONS
An order has been placed for AutoPAP machine and has been sent to a medical equipment company. The medical equipment company will send all the needed information to your insurance provider for approval. Shortly, you will be receiving a phone call about scheduling you for AutoPAP set up. If you do not hear from the company within 3 weeks, please make us aware by calling us or by sending a message through the patient portal online. You can also call them directly at   [facility]   Toll free 24 hr line for assistance: [phone]   Option 1: CPAP  (press 1 - supplies ([employer]), press 2 questions regarding your machine)  Option 2: Oxygen, Nebulizer, Ventilator  Option 3: service  Option 4: Discharge (, providers, nurses)  Option 5: Diabetics  Option 6: Ortho (ortho braces, walker, wheelchairs, etc)  Option 7: Billing    You will also need to follow back up in the clinic with your provider in 10 weeks to review compliance of your AutoPAP. Your insurance requires documented compliance (wearing over 4hrs a night). Please bring the AutoPAP with you to the follow up visit.

## 2022-09-27 NOTE — PROGRESS NOTES
"Subjective:      Patient ID: Gina Adhikari is a 42 y.o. female.    Chief Complaint: Sleep Apnea    HPI    Patient presents to the office today for evaluation of sleep apnea.  Patient with snoring and witnessed apneas. Patient has problems falling asleep, but wakes up frequently throughout the night.  Mind races when trying to fall asleep. Takes klonopin. Patient does not wake up feeling refreshed in the morning.  Patient with daytime hypersomnolence.  [place] Sleepiness Scale score 24.  Patient has had symptoms for years. Comorbidities include BMI 45  Not scheduling enough sleep time.   Bedtime: 11PM, Watches TV to fall asleep.  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     Patient Active Problem List   Diagnosis    Class 3 (severe) obesity due to excess calories with serious comorbidity and body mass index (BMI) of 40.0 to 44.9 in adult    Insulin resistance    MANJINDER (obstructive sleep apnea)    Vitamin D deficiency    Recurrent mild major depressive disorder with anxiety         /74   Pulse 88   Resp 18   Ht 5' 2" (1.575 m)   Wt 111.8 kg (246 lb 7.6 oz)   SpO2 99%   BMI 45.08 kg/m²   Body mass index is 45.08 kg/m².    Review of Systems   Constitutional:  Positive for fatigue.   Respiratory:  Positive for snoring and somnolence.    Psychiatric/Behavioral:  Positive for sleep disturbance.    All other systems reviewed and are negative.      Objective:      Physical Exam  Constitutional:       Appearance: She is well-developed. She is obese.   HENT:      Head: Normocephalic and atraumatic.      Mouth/Throat:      Comments: Mallampati Score: IV  Neck:      Comments: Neck circumference: 16 inches   Cardiovascular:      Rate and Rhythm: Normal rate and regular rhythm.   Pulmonary:      Effort: Pulmonary effort is normal.      Breath sounds: Normal breath sounds.   Abdominal:      Palpations: Abdomen is soft.   Musculoskeletal:         General: Normal range of motion.      Cervical back: " Normal range of motion and neck supple.   Skin:     General: Skin is warm and dry.   Neurological:      Mental Status: She is alert and oriented to person, place, and time.     Personal Diagnostic Review  HST positive for MANJINDER    Assessment:     1. MANJINDER (obstructive sleep apnea)    2. Insomnia, unspecified type    3. Morbid obesity with BMI of 45.0-49.9, adult    4. Lack of adequate sleep       Outpatient Encounter Medications as of 9/27/2022   Medication Sig Dispense Refill    buPROPion (WELLBUTRIN XL) 150 MG TB24 tablet Take 1 tablet (150 mg total) by mouth once daily. 30 tablet 2    clonazePAM (KLONOPIN) 0.5 MG tablet Take 0.5-1 tablets (0.25-0.5 mg total) by mouth 2 (two) times daily as needed (for anxiety or insomnia). MAX 1.5 TABLETS PER DAY 45 tablet 2    meclizine (ANTIVERT) 25 mg tablet Take 25 mg by mouth 3 (three) times daily as needed.      multivitamin (THERAGRAN) per tablet Take 1 tablet by mouth once daily.       No facility-administered encounter medications on file as of 9/27/2022.     No orders of the defined types were placed in this encounter.    Plan:     AutoPAP and follow up in 10 weeks with download of data card and review of symptoms.   Schedule 8 hr sleep time  Weight loss and exercise to improve overall health.    Problem List Items Addressed This Visit          Other    MANJINDER (obstructive sleep apnea) - Primary     Other Visit Diagnoses       Insomnia, unspecified type        Morbid obesity with BMI of 45.0-49.9, adult        Lack of adequate sleep                 Thank you Dr. Perez for this consultation.

## 2022-10-27 ENCOUNTER — OFFICE VISIT (OUTPATIENT)
Dept: INTERNAL MEDICINE | Facility: CLINIC | Age: 42
End: 2022-10-27
Payer: COMMERCIAL

## 2022-10-27 ENCOUNTER — PATIENT MESSAGE (OUTPATIENT)
Dept: INTERNAL MEDICINE | Facility: CLINIC | Age: 42
End: 2022-10-27

## 2022-10-27 VITALS — DIASTOLIC BLOOD PRESSURE: 74 MMHG | SYSTOLIC BLOOD PRESSURE: 110 MMHG

## 2022-10-27 DIAGNOSIS — E55.9 VITAMIN D DEFICIENCY: Chronic | ICD-10-CM

## 2022-10-27 DIAGNOSIS — E66.01 CLASS 3 SEVERE OBESITY DUE TO EXCESS CALORIES WITH SERIOUS COMORBIDITY AND BODY MASS INDEX (BMI) OF 40.0 TO 44.9 IN ADULT: Chronic | ICD-10-CM

## 2022-10-27 DIAGNOSIS — Z13.1 SCREENING FOR DIABETES MELLITUS: ICD-10-CM

## 2022-10-27 DIAGNOSIS — E88.819 INSULIN RESISTANCE: Chronic | ICD-10-CM

## 2022-10-27 DIAGNOSIS — Z13.220 SCREENING FOR LIPID DISORDERS: ICD-10-CM

## 2022-10-27 DIAGNOSIS — F51.04 PSYCHOPHYSIOLOGIC INSOMNIA: ICD-10-CM

## 2022-10-27 DIAGNOSIS — G47.33 OSA (OBSTRUCTIVE SLEEP APNEA): Chronic | ICD-10-CM

## 2022-10-27 DIAGNOSIS — F41.9 RECURRENT MILD MAJOR DEPRESSIVE DISORDER WITH ANXIETY: Primary | Chronic | ICD-10-CM

## 2022-10-27 DIAGNOSIS — F33.0 RECURRENT MILD MAJOR DEPRESSIVE DISORDER WITH ANXIETY: Primary | Chronic | ICD-10-CM

## 2022-10-27 PROCEDURE — 1159F PR MEDICATION LIST DOCUMENTED IN MEDICAL RECORD: ICD-10-PCS | Mod: CPTII,95,, | Performed by: FAMILY MEDICINE

## 2022-10-27 PROCEDURE — 1159F MED LIST DOCD IN RCRD: CPT | Mod: CPTII,95,, | Performed by: FAMILY MEDICINE

## 2022-10-27 PROCEDURE — 99214 PR OFFICE/OUTPT VISIT, EST, LEVL IV, 30-39 MIN: ICD-10-PCS | Mod: 95,,, | Performed by: FAMILY MEDICINE

## 2022-10-27 PROCEDURE — 3074F SYST BP LT 130 MM HG: CPT | Mod: CPTII,95,, | Performed by: FAMILY MEDICINE

## 2022-10-27 PROCEDURE — 1160F PR REVIEW ALL MEDS BY PRESCRIBER/CLIN PHARMACIST DOCUMENTED: ICD-10-PCS | Mod: CPTII,95,, | Performed by: FAMILY MEDICINE

## 2022-10-27 PROCEDURE — 3078F DIAST BP <80 MM HG: CPT | Mod: CPTII,95,, | Performed by: FAMILY MEDICINE

## 2022-10-27 PROCEDURE — 3074F PR MOST RECENT SYSTOLIC BLOOD PRESSURE < 130 MM HG: ICD-10-PCS | Mod: CPTII,95,, | Performed by: FAMILY MEDICINE

## 2022-10-27 PROCEDURE — 99214 OFFICE O/P EST MOD 30 MIN: CPT | Mod: 95,,, | Performed by: FAMILY MEDICINE

## 2022-10-27 PROCEDURE — 1160F RVW MEDS BY RX/DR IN RCRD: CPT | Mod: CPTII,95,, | Performed by: FAMILY MEDICINE

## 2022-10-27 PROCEDURE — 3078F PR MOST RECENT DIASTOLIC BLOOD PRESSURE < 80 MM HG: ICD-10-PCS | Mod: CPTII,95,, | Performed by: FAMILY MEDICINE

## 2022-10-27 RX ORDER — CHOLECALCIFEROL (VITAMIN D3) 25 MCG
1000 TABLET ORAL DAILY
Qty: 90 TABLET | Refills: 3 | Status: SHIPPED | OUTPATIENT
Start: 2022-10-27 | End: 2022-12-03 | Stop reason: DRUGHIGH

## 2022-10-27 RX ORDER — BUPROPION HYDROCHLORIDE 300 MG/1
300 TABLET ORAL DAILY
Qty: 30 TABLET | Refills: 2 | Status: SHIPPED | OUTPATIENT
Start: 2022-10-27 | End: 2022-12-03 | Stop reason: SDUPTHER

## 2022-10-27 RX ORDER — CLONAZEPAM 0.5 MG/1
.25-.5 TABLET ORAL 2 TIMES DAILY PRN
Qty: 45 TABLET | Refills: 2 | Status: SHIPPED | OUTPATIENT
Start: 2022-10-27 | End: 2022-12-03 | Stop reason: SDUPTHER

## 2022-10-27 NOTE — PATIENT INSTRUCTIONS
"Gina Morfin.    It was nice seeing you again you during your virtual visit.    Please take the time now to schedule your labs (listed below) at least two business days before your next appointment with me on 12/3/2022. The labs need to be done when you are fasting (nothing to eat or drink except for water or other calorie-free beverages for 8 hours before getting your blood drawn).    You can schedule your labs by calling our appointment desk at [phone]. If you have any difficulty, send my staff a message, and they will be glad to help.    Thanks for letting me care for you, and thanks for trusting [facility] with your healthcare needs.    All the best,    KWAKU Perez MD   P.S. Don't forget to review your After Visit Summary from your [facility] sumit. (Just click on "Appointments," choose the appropriate past appointment, and click on "View After Visit Summary.")    Lab Orders Placed This Encounter   Procedures    Comprehensive Metabolic Panel    Lipid Panel    Hemoglobin A1C    Insulin, Random    Vitamin D      Medications Ordered This Encounter   Medications    buPROPion (WELLBUTRIN XL) 300 MG 24 hr tablet     Sig: Take 1 tablet (300 mg total) by mouth once daily.     Dispense:  30 tablet     Refill:  2    clonazePAM (KLONOPIN) 0.5 MG tablet     Sig: Take 0.5-1 tablets (0.25-0.5 mg total) by mouth 2 (two) times daily as needed (for anxiety or insomnia). MAX 1.5 TABLETS PER DAY     Dispense:  45 tablet     Refill:  2    vitamin D (VITAMIN D3) 1000 units Tab     Sig: Take 1 tablet (1,000 Units total) by mouth once daily.     Dispense:  90 tablet     Refill:  3     If insurance does not cover, please offer OTC equivalent. OK to vary dispense quantity to correspond with in-stock OTC unit quantities.     Medications Discontinued During This Encounter   Medication Reason    buPROPion (WELLBUTRIN XL) 150 MG TB24 tablet Dose adjustment    clonazePAM (KLONOPIN) 0.5 MG tablet Reorder      "

## 2022-11-30 ENCOUNTER — LAB VISIT (OUTPATIENT)
Dept: LAB | Facility: HOSPITAL | Age: 42
End: 2022-11-30
Attending: FAMILY MEDICINE
Payer: COMMERCIAL

## 2022-11-30 DIAGNOSIS — Z13.220 SCREENING FOR LIPID DISORDERS: ICD-10-CM

## 2022-11-30 DIAGNOSIS — E88.819 INSULIN RESISTANCE: Chronic | ICD-10-CM

## 2022-11-30 DIAGNOSIS — E66.01 CLASS 3 SEVERE OBESITY DUE TO EXCESS CALORIES WITH SERIOUS COMORBIDITY AND BODY MASS INDEX (BMI) OF 40.0 TO 44.9 IN ADULT: Chronic | ICD-10-CM

## 2022-11-30 DIAGNOSIS — Z13.1 SCREENING FOR DIABETES MELLITUS: ICD-10-CM

## 2022-11-30 DIAGNOSIS — E55.9 VITAMIN D DEFICIENCY: Chronic | ICD-10-CM

## 2022-11-30 LAB
25(OH)D3+25(OH)D2 SERPL-MCNC: 22 NG/ML (ref 30–96)
ALBUMIN SERPL BCP-MCNC: 3.6 G/DL (ref 3.5–5.2)
ALP SERPL-CCNC: 75 U/L (ref 55–135)
ALT SERPL W/O P-5'-P-CCNC: 21 U/L (ref 10–44)
ANION GAP SERPL CALC-SCNC: 8 MMOL/L (ref 8–16)
AST SERPL-CCNC: 16 U/L (ref 10–40)
BILIRUB SERPL-MCNC: 0.7 MG/DL (ref 0.1–1)
BUN SERPL-MCNC: 10 MG/DL (ref 6–20)
CALCIUM SERPL-MCNC: 9.3 MG/DL (ref 8.7–10.5)
CHLORIDE SERPL-SCNC: 104 MMOL/L (ref 95–110)
CHOLEST SERPL-MCNC: 167 MG/DL (ref 120–199)
CHOLEST/HDLC SERPL: 4.2 {RATIO} (ref 2–5)
CO2 SERPL-SCNC: 25 MMOL/L (ref 23–29)
CREAT SERPL-MCNC: 0.8 MG/DL (ref 0.5–1.4)
EST. GFR  (NO RACE VARIABLE): >60 ML/MIN/1.73 M^2
ESTIMATED AVG GLUCOSE: 105 MG/DL (ref 68–131)
GLUCOSE SERPL-MCNC: 88 MG/DL (ref 70–110)
HBA1C MFR BLD: 5.3 % (ref 4–5.6)
HDLC SERPL-MCNC: 40 MG/DL (ref 40–75)
HDLC SERPL: 24 % (ref 20–50)
INSULIN COLLECTION INTERVAL: NORMAL
INSULIN SERPL-ACNC: 7.8 UU/ML
LDLC SERPL CALC-MCNC: 110 MG/DL (ref 63–159)
NONHDLC SERPL-MCNC: 127 MG/DL
POTASSIUM SERPL-SCNC: 4.1 MMOL/L (ref 3.5–5.1)
PROT SERPL-MCNC: 7 G/DL (ref 6–8.4)
SODIUM SERPL-SCNC: 137 MMOL/L (ref 136–145)
TRIGL SERPL-MCNC: 85 MG/DL (ref 30–150)

## 2022-11-30 PROCEDURE — 83036 HEMOGLOBIN GLYCOSYLATED A1C: CPT | Performed by: FAMILY MEDICINE

## 2022-11-30 PROCEDURE — 82306 VITAMIN D 25 HYDROXY: CPT | Performed by: FAMILY MEDICINE

## 2022-11-30 PROCEDURE — 83525 ASSAY OF INSULIN: CPT | Performed by: FAMILY MEDICINE

## 2022-11-30 PROCEDURE — 80053 COMPREHEN METABOLIC PANEL: CPT | Performed by: FAMILY MEDICINE

## 2022-11-30 PROCEDURE — 80061 LIPID PANEL: CPT | Performed by: FAMILY MEDICINE

## 2022-12-03 ENCOUNTER — OFFICE VISIT (OUTPATIENT)
Dept: INTERNAL MEDICINE | Facility: CLINIC | Age: 42
End: 2022-12-03
Payer: COMMERCIAL

## 2022-12-03 VITALS
WEIGHT: 245.56 LBS | HEIGHT: 62 IN | DIASTOLIC BLOOD PRESSURE: 78 MMHG | OXYGEN SATURATION: 99 % | SYSTOLIC BLOOD PRESSURE: 112 MMHG | HEART RATE: 83 BPM | BODY MASS INDEX: 45.19 KG/M2 | TEMPERATURE: 97 F

## 2022-12-03 DIAGNOSIS — Z23 NEED FOR COVID-19 VACCINE: ICD-10-CM

## 2022-12-03 DIAGNOSIS — Z23 NEED FOR DIPHTHERIA-TETANUS-PERTUSSIS (TDAP) VACCINE: ICD-10-CM

## 2022-12-03 DIAGNOSIS — E88.819 INSULIN RESISTANCE: Chronic | ICD-10-CM

## 2022-12-03 DIAGNOSIS — J00 CORYZA: ICD-10-CM

## 2022-12-03 DIAGNOSIS — Z20.822 SUSPECTED COVID-19 VIRUS INFECTION: ICD-10-CM

## 2022-12-03 DIAGNOSIS — E66.01 CLASS 3 SEVERE OBESITY DUE TO EXCESS CALORIES WITH SERIOUS COMORBIDITY AND BODY MASS INDEX (BMI) OF 40.0 TO 44.9 IN ADULT: Chronic | ICD-10-CM

## 2022-12-03 DIAGNOSIS — E55.9 VITAMIN D DEFICIENCY: Chronic | ICD-10-CM

## 2022-12-03 DIAGNOSIS — F41.9 RECURRENT MILD MAJOR DEPRESSIVE DISORDER WITH ANXIETY: Primary | Chronic | ICD-10-CM

## 2022-12-03 DIAGNOSIS — Z23 NEED FOR INFLUENZA VACCINATION: ICD-10-CM

## 2022-12-03 DIAGNOSIS — F51.04 PSYCHOPHYSIOLOGIC INSOMNIA: ICD-10-CM

## 2022-12-03 DIAGNOSIS — G47.33 OBSTRUCTIVE SLEEP APNEA: Chronic | ICD-10-CM

## 2022-12-03 DIAGNOSIS — K21.9 GASTROESOPHAGEAL REFLUX DISEASE WITHOUT ESOPHAGITIS: ICD-10-CM

## 2022-12-03 DIAGNOSIS — F33.0 RECURRENT MILD MAJOR DEPRESSIVE DISORDER WITH ANXIETY: Primary | Chronic | ICD-10-CM

## 2022-12-03 LAB
CTP QC/QA: YES
SARS-COV-2 RDRP RESP QL NAA+PROBE: NEGATIVE

## 2022-12-03 PROCEDURE — 99999 PR PBB SHADOW E&M-EST. PATIENT-LVL IV: ICD-10-PCS | Mod: PBBFAC,,, | Performed by: FAMILY MEDICINE

## 2022-12-03 PROCEDURE — 3044F PR MOST RECENT HEMOGLOBIN A1C LEVEL <7.0%: ICD-10-PCS | Mod: CPTII,S$GLB,, | Performed by: FAMILY MEDICINE

## 2022-12-03 PROCEDURE — 99214 OFFICE O/P EST MOD 30 MIN: CPT | Mod: 25,S$GLB,, | Performed by: FAMILY MEDICINE

## 2022-12-03 PROCEDURE — 3008F PR BODY MASS INDEX (BMI) DOCUMENTED: ICD-10-PCS | Mod: CPTII,S$GLB,, | Performed by: FAMILY MEDICINE

## 2022-12-03 PROCEDURE — 1160F PR REVIEW ALL MEDS BY PRESCRIBER/CLIN PHARMACIST DOCUMENTED: ICD-10-PCS | Mod: CPTII,S$GLB,, | Performed by: FAMILY MEDICINE

## 2022-12-03 PROCEDURE — 3078F DIAST BP <80 MM HG: CPT | Mod: CPTII,S$GLB,, | Performed by: FAMILY MEDICINE

## 2022-12-03 PROCEDURE — 99999 PR PBB SHADOW E&M-EST. PATIENT-LVL IV: CPT | Mod: PBBFAC,,, | Performed by: FAMILY MEDICINE

## 2022-12-03 PROCEDURE — 90471 FLU VACCINE (QUAD) GREATER THAN OR EQUAL TO 3YO PRESERVATIVE FREE IM: ICD-10-PCS | Mod: S$GLB,,, | Performed by: FAMILY MEDICINE

## 2022-12-03 PROCEDURE — 99214 PR OFFICE/OUTPT VISIT, EST, LEVL IV, 30-39 MIN: ICD-10-PCS | Mod: 25,S$GLB,, | Performed by: FAMILY MEDICINE

## 2022-12-03 PROCEDURE — 90686 FLU VACCINE (QUAD) GREATER THAN OR EQUAL TO 3YO PRESERVATIVE FREE IM: ICD-10-PCS | Mod: S$GLB,,, | Performed by: FAMILY MEDICINE

## 2022-12-03 PROCEDURE — 3078F PR MOST RECENT DIASTOLIC BLOOD PRESSURE < 80 MM HG: ICD-10-PCS | Mod: CPTII,S$GLB,, | Performed by: FAMILY MEDICINE

## 2022-12-03 PROCEDURE — 90471 IMMUNIZATION ADMIN: CPT | Mod: S$GLB,,, | Performed by: FAMILY MEDICINE

## 2022-12-03 PROCEDURE — 1160F RVW MEDS BY RX/DR IN RCRD: CPT | Mod: CPTII,S$GLB,, | Performed by: FAMILY MEDICINE

## 2022-12-03 PROCEDURE — 87635: ICD-10-PCS | Mod: QW,S$GLB,, | Performed by: FAMILY MEDICINE

## 2022-12-03 PROCEDURE — 90686 IIV4 VACC NO PRSV 0.5 ML IM: CPT | Mod: S$GLB,,, | Performed by: FAMILY MEDICINE

## 2022-12-03 PROCEDURE — 87635 SARS-COV-2 COVID-19 AMP PRB: CPT | Mod: QW,S$GLB,, | Performed by: FAMILY MEDICINE

## 2022-12-03 PROCEDURE — 3074F SYST BP LT 130 MM HG: CPT | Mod: CPTII,S$GLB,, | Performed by: FAMILY MEDICINE

## 2022-12-03 PROCEDURE — 1159F MED LIST DOCD IN RCRD: CPT | Mod: CPTII,S$GLB,, | Performed by: FAMILY MEDICINE

## 2022-12-03 PROCEDURE — 1159F PR MEDICATION LIST DOCUMENTED IN MEDICAL RECORD: ICD-10-PCS | Mod: CPTII,S$GLB,, | Performed by: FAMILY MEDICINE

## 2022-12-03 PROCEDURE — 3008F BODY MASS INDEX DOCD: CPT | Mod: CPTII,S$GLB,, | Performed by: FAMILY MEDICINE

## 2022-12-03 PROCEDURE — 3074F PR MOST RECENT SYSTOLIC BLOOD PRESSURE < 130 MM HG: ICD-10-PCS | Mod: CPTII,S$GLB,, | Performed by: FAMILY MEDICINE

## 2022-12-03 PROCEDURE — 3044F HG A1C LEVEL LT 7.0%: CPT | Mod: CPTII,S$GLB,, | Performed by: FAMILY MEDICINE

## 2022-12-03 RX ORDER — BUPROPION HYDROCHLORIDE 300 MG/1
300 TABLET ORAL DAILY
Qty: 90 TABLET | Refills: 3 | Status: SHIPPED | OUTPATIENT
Start: 2022-12-03 | End: 2023-07-14

## 2022-12-03 RX ORDER — PANTOPRAZOLE SODIUM 40 MG/1
40 TABLET, DELAYED RELEASE ORAL DAILY
Qty: 90 TABLET | Refills: 2 | Status: SHIPPED | OUTPATIENT
Start: 2022-12-03 | End: 2023-07-14 | Stop reason: SDUPTHER

## 2022-12-03 RX ORDER — CLONAZEPAM 0.5 MG/1
.25-.5 TABLET ORAL 2 TIMES DAILY PRN
Qty: 45 TABLET | Refills: 5 | Status: SHIPPED | OUTPATIENT
Start: 2022-12-03 | End: 2023-07-14 | Stop reason: SDUPTHER

## 2022-12-03 RX ORDER — CHOLECALCIFEROL (VITAMIN D3) 125 MCG
5000 CAPSULE ORAL DAILY
Qty: 90 CAPSULE | Refills: 11 | Status: SHIPPED | OUTPATIENT
Start: 2022-12-03 | End: 2023-07-14 | Stop reason: SDUPTHER

## 2022-12-03 NOTE — ASSESSMENT & PLAN NOTE
Clinically stable.  Future Appointments   Date Time Provider Department Center   12/14/2022  9:00 AM Elizabeth Lejeune, NP [facility]

## 2022-12-03 NOTE — ASSESSMENT & PLAN NOTE
Has been taking D3 1000 IU daily, but started D3 2000 IU about 3 weeks ago.  Lab Results   Component Value Date    [id number] 22 (L) 11/30/2022    [id number] 21 (L) 11/11/2021

## 2022-12-03 NOTE — PROGRESS NOTES
OFFICE VISIT 12/3/22 10:30 AM CST    CHIEF COMPLAINT: Follow-up    ASSESSMENT & PLAN  1. Recurrent mild major depressive disorder with anxiety  Assessment & Plan:  Gina reports she is doing well on her bupropion 300 mg QD and clonazepam PRN. Gina reports preceiving no adverse side-effects and wants to continue current treatment.  Future Appointments   Date Time Provider Department Center   1/6/2023  9:00 AM PRISCILLA Carlos PSYCH [facility]   Review [facility] Board of Pharmacy Controlled Prescription Drug Monitoring database shows the following prescriptions (sorted by date filled).    10/27/2022 - Clonazepam 0.5 Mg Tablet, QTY 45 (QS for 30 days), originally written 10/27/2022 by Claudette Quevedo.    09/16/2022 - Clonazepam 0.5 Mg Tablet, QTY 45 (QS for 23 days), originally written 08/02/2022 by Claudette Quevedo.    08/02/2022 - Clonazepam 0.5 Mg Tablet, QTY 45 (QS for 23 days), originally written 08/02/2022 by Claudette Quevedo.    Orders:  -     buPROPion (WELLBUTRIN XL) 300 MG 24 hr tablet; Take 1 tablet (300 mg total) by mouth once daily.  Dispense: 90 tablet; Refill: 3  -     clonazePAM (KLONOPIN) 0.5 MG tablet; Take 0.5-1 tablets (0.25-0.5 mg total) by mouth 2 (two) times daily as needed (for anxiety or insomnia). MAX 1.5 TABLETS PER DAY  Dispense: 45 tablet; Refill: 5    2. Insulin resistance  Assessment & Plan:  Lab Results   Component Value Date    HGBA1C 5.3 11/30/2022    HGBA1C 5.4 11/11/2021    GLU 88 11/30/2022    GLU 95 11/11/2021     No results found for: GLUCFAST, NZFI8QL, KALB2WG  Lab Results   Component Value Date    LABINSU 7.8 11/30/2022   Therapeutic lifestyle changes encouraged.       3. Obstructive sleep apnea, mild (AHI = 10, RDI = 17)  Assessment & Plan:  Clinically stable.  Future Appointments   Date Time Provider Department Center   12/14/2022  9:00 AM Elizabeth Lejeune, NP [facility] SLEEP [place]         4. Class 3 (severe) obesity due to excess calories with serious comorbidity and body mass index  "(BMI) of 40.0 to 44.9 in adult  Assessment & Plan:  Wt Readings from Last 6 Encounters:   12/03/22 111.4 kg (245 lb 9.5 oz)   09/27/22 111.8 kg (246 lb 7.6 oz)   08/02/22 111 kg (244 lb 11.4 oz)   07/25/22 111.6 kg (246 lb)   11/01/21 108.4 kg (238 lb 15.7 oz)     Estimated body mass index is 44.92 kg/m² as calculated from the following:    Height as of this encounter: 5' 2" (1.575 m).    Weight as of this encounter: 111.4 kg (245 lb 9.5 oz).    Previously referred to [facility], but never had appointment. Continued therapeutic lifestyle changes encouraged.      5. Vitamin D deficiency  Assessment & Plan:  Has been taking D3 1000 IU daily, but started D3 2000 IU about 3 weeks ago.  Lab Results   Component Value Date    [id number] 22 (L) 11/30/2022    [id number] 21 (L) 11/11/2021        Orders:  -     cholecalciferol, vitamin D3, (D3-5000) 125 mcg (5,000 unit) capsule; Take 1 capsule (5,000 Units total) by mouth once daily.  Dispense: 90 capsule; Refill: 11    6. Suspected COVID-19 virus infection  -     POCT COVID-19 Rapid Screening    7. Coryza  -     POCT COVID-19 Rapid Screening    8. Psychophysiologic insomnia  -     clonazePAM (KLONOPIN) 0.5 MG tablet; Take 0.5-1 tablets (0.25-0.5 mg total) by mouth 2 (two) times daily as needed (for anxiety or insomnia). MAX 1.5 TABLETS PER DAY  Dispense: 45 tablet; Refill: 5    9. Need for influenza vaccination    10. Need for COVID-19 vaccine    11. Need for diphtheria-tetanus-pertussis (Tdap) vaccine    12. Gastroesophageal reflux disease without esophagitis  Assessment & Plan:  She reports typical GERD symptoms as follows. Quality described as burning dyspepsia. Location reported as epigastric with radiation upwards. Severity described as moderate. Exacerbating factors include recumbent positioning and certain foods. Alleviating factors include OTC antacids, which provide transient symptom relief. She reports no vomiting, blood in stool, or dark tarry stool. "     Orders:  -     pantoprazole (PROTONIX) 40 MG tablet; Take 1 tablet (40 mg total) by mouth once daily.  Dispense: 90 tablet; Refill: 2    Other orders  -     Influenza - Quadrivalent (PF)    Unless noted herein, any chronic conditions are represented as and appear compensated/controlled and stable, and no other significant complaints or concerns were reported.    Follow up in 5 months (on 5/3/2023) for re-evaluate problem(s) discussed today.   Future Appointments   Date Time Provider Department Center   12/14/2022  9:00 AM Elizabeth Lejeune, CHARLES [facility] SLEEP [facility]   1/6/2023  9:00 AM JONA CarlosW [facility] PSYCH [facility]   1/11/2023  1:15 PM Chris Khan OD [facility]       PRESCRIPTION DRUG MANAGEMENT  Outpatient Medications Prior to Visit   Medication Sig Dispense Refill    multivitamin (THERAGRAN) per tablet Take 1 tablet by mouth once daily.      buPROPion (WELLBUTRIN XL) 300 MG 24 hr tablet Take 1 tablet (300 mg total) by mouth once daily. 30 tablet 2    clonazePAM (KLONOPIN) 0.5 MG tablet Take 0.5-1 tablets (0.25-0.5 mg total) by mouth 2 (two) times daily as needed (for anxiety or insomnia). MAX 1.5 TABLETS PER DAY 45 tablet 2    vitamin D (VITAMIN D3) 1000 units Tab Take 1 tablet (1,000 Units total) by mouth once daily. 90 tablet 3    meclizine (ANTIVERT) 25 mg tablet Take 25 mg by mouth 3 (three) times daily as needed.       No facility-administered medications prior to visit.     Medications Discontinued During This Encounter   Medication Reason    vitamin D (VITAMIN D3) 1000 units Tab Dose adjustment    clonazePAM (KLONOPIN) 0.5 MG tablet Reorder    buPROPion (WELLBUTRIN XL) 300 MG 24 hr tablet Reorder     Medications Ordered This Encounter   Medications    buPROPion (WELLBUTRIN XL) 300 MG 24 hr tablet     Sig: Take 1 tablet (300 mg total) by mouth once daily.     Dispense:  90 tablet     Refill:  3    cholecalciferol, vitamin D3, (D3-5000) 125 mcg (5,000 unit) capsule     Sig:  "Take 1 capsule (5,000 Units total) by mouth once daily.     Dispense:  90 capsule     Refill:  11     If insurance does not cover, please offer OTC equivalent. OK to vary dispense quantity to correspond with in-stock OTC unit quantities.    clonazePAM (KLONOPIN) 0.5 MG tablet     Sig: Take 0.5-1 tablets (0.25-0.5 mg total) by mouth 2 (two) times daily as needed (for anxiety or insomnia). MAX 1.5 TABLETS PER DAY     Dispense:  45 tablet     Refill:  5    pantoprazole (PROTONIX) 40 MG tablet     Sig: Take 1 tablet (40 mg total) by mouth once daily.     Dispense:  90 tablet     Refill:  2     Review of Systems   Constitutional:  Negative for activity change and unexpected weight change.   HENT:  Negative for hearing loss, rhinorrhea and trouble swallowing.    Eyes:  Negative for discharge.   Respiratory:  Negative for chest tightness and wheezing.    Cardiovascular:  Negative for chest pain and palpitations.   Gastrointestinal:  Negative for blood in stool and vomiting.   Endocrine: Negative for polydipsia and polyuria.   Genitourinary:  Negative for difficulty urinating, dysuria, hematuria and menstrual problem.   Musculoskeletal:  Positive for neck pain. Negative for arthralgias and joint swelling.   Neurological:  Negative for weakness and headaches.   Psychiatric/Behavioral:  Positive for dysphoric mood (improved). Negative for confusion.     PHYSICAL EXAM  Vitals:    12/03/22 1053   BP: 112/78   BP Location: Left arm   Patient Position: Sitting   BP Method: Large (Manual)   Pulse: 83   Temp: 97.1 °F (36.2 °C)   TempSrc: Tympanic   SpO2: 99%   Weight: 111.4 kg (245 lb 9.5 oz)   Height: 5' 2" (1.575 m)   Physical Exam  Vitals reviewed.   Constitutional:       General: She is not in acute distress.     Appearance: Normal appearance. She is not ill-appearing, toxic-appearing or diaphoretic.   Cardiovascular:      Rate and Rhythm: Normal rate.   Pulmonary:      Effort: Pulmonary effort is normal.   Abdominal:      " "Tenderness: There is no abdominal tenderness.   Neurological:      Mental Status: She is alert and oriented to person, place, and time. Mental status is at baseline.   Psychiatric:         Mood and Affect: Mood normal.         Behavior: Behavior normal.         Judgment: Judgment normal.        Documentation entered by me for this encounter may have been done in part using speech-recognition technology. Although I have made an effort to ensure accuracy, "sound like" errors may exist and should be interpreted in context.   "

## 2022-12-03 NOTE — ASSESSMENT & PLAN NOTE
Gina reports she is doing well on her bupropion 300 mg QD and clonazepam PRN. Gina reports preceiving no adverse side-effects and wants to continue current treatment.  Future Appointments   Date Time Provider Department Center   1/6/2023  9:00 AM Riki Fitch LCSW [facility] Board of Pharmacy Controlled Prescription Drug Monitoring database shows the following prescriptions (sorted by date filled).    10/27/2022 - Clonazepam 0.5 Mg Tablet, QTY 45 (QS for 30 days), originally written 10/27/2022 by Claudette Quevedo.    09/16/2022 - Clonazepam 0.5 Mg Tablet, QTY 45 (QS for 23 days), originally written 08/02/2022 by Claudette Quevedo.    08/02/2022 - Clonazepam 0.5 Mg Tablet, QTY 45 (QS for 23 days), originally written 08/02/2022 by Claudette Quevedo.

## 2022-12-03 NOTE — ASSESSMENT & PLAN NOTE
She reports typical GERD symptoms as follows. Quality described as burning dyspepsia. Location reported as epigastric with radiation upwards. Severity described as moderate. Exacerbating factors include recumbent positioning and certain foods. Alleviating factors include OTC antacids, which provide transient symptom relief. She reports no vomiting, blood in stool, or dark tarry stool.

## 2022-12-03 NOTE — ASSESSMENT & PLAN NOTE
Lab Results   Component Value Date    HGBA1C 5.3 11/30/2022    HGBA1C 5.4 11/11/2021    GLU 88 11/30/2022    GLU 95 11/11/2021     No results found for: GLUCFAST, PMSK1MQ, MAJX6XN  Lab Results   Component Value Date    LABINSU 7.8 11/30/2022   Therapeutic lifestyle changes encouraged.

## 2022-12-03 NOTE — ASSESSMENT & PLAN NOTE
"Wt Readings from Last 6 Encounters:   12/03/22 111.4 kg (245 lb 9.5 oz)   09/27/22 111.8 kg (246 lb 7.6 oz)   08/02/22 111 kg (244 lb 11.4 oz)   07/25/22 111.6 kg (246 lb)   11/01/21 108.4 kg (238 lb 15.7 oz)     Estimated body mass index is 44.92 kg/m² as calculated from the following:    Height as of this encounter: 5' 2" (1.575 m).    Weight as of this encounter: 111.4 kg (245 lb 9.5 oz).    Previously referred to [facility]estyle clinic, but never had appointment. Continued therapeutic lifestyle changes encouraged.  "

## 2022-12-03 NOTE — PATIENT INSTRUCTIONS
I recommend that you get the new bi-valent COVID-19 vaccine booster that protects against the Omicron sub-variants of COVID-19.    People who are fully vaccinated are much better protected from severe illness (hospitalization and death) from COVID-19 than people who are unvaccinated or not fully vaccinated.    You can learn more about the COVID-19 vaccine and booster shot options at [email or web address]://[email or web address]/vaccine-faqs.    You can schedule an appointment for your COVID-19 booster vaccination with [facility] or by calling [phone].    Please take care of yourself. You are important to me.

## 2022-12-14 ENCOUNTER — OFFICE VISIT (OUTPATIENT)
Dept: SLEEP MEDICINE | Facility: CLINIC | Age: 42
End: 2022-12-14
Payer: COMMERCIAL

## 2022-12-14 VITALS
HEART RATE: 79 BPM | DIASTOLIC BLOOD PRESSURE: 84 MMHG | WEIGHT: 245.38 LBS | RESPIRATION RATE: 18 BRPM | OXYGEN SATURATION: 98 % | BODY MASS INDEX: 45.15 KG/M2 | SYSTOLIC BLOOD PRESSURE: 120 MMHG | HEIGHT: 62 IN

## 2022-12-14 DIAGNOSIS — E66.01 MORBID OBESITY WITH BMI OF 45.0-49.9, ADULT: ICD-10-CM

## 2022-12-14 DIAGNOSIS — G47.33 OSA (OBSTRUCTIVE SLEEP APNEA): Primary | ICD-10-CM

## 2022-12-14 DIAGNOSIS — Z72.820 LACK OF ADEQUATE SLEEP: ICD-10-CM

## 2022-12-14 DIAGNOSIS — G47.33 OBSTRUCTIVE SLEEP APNEA: Chronic | ICD-10-CM

## 2022-12-14 DIAGNOSIS — G47.00 INSOMNIA, UNSPECIFIED TYPE: ICD-10-CM

## 2022-12-14 PROCEDURE — 1159F PR MEDICATION LIST DOCUMENTED IN MEDICAL RECORD: ICD-10-PCS | Mod: CPTII,S$GLB,, | Performed by: NURSE PRACTITIONER

## 2022-12-14 PROCEDURE — 3074F PR MOST RECENT SYSTOLIC BLOOD PRESSURE < 130 MM HG: ICD-10-PCS | Mod: CPTII,S$GLB,, | Performed by: NURSE PRACTITIONER

## 2022-12-14 PROCEDURE — 3079F PR MOST RECENT DIASTOLIC BLOOD PRESSURE 80-89 MM HG: ICD-10-PCS | Mod: CPTII,S$GLB,, | Performed by: NURSE PRACTITIONER

## 2022-12-14 PROCEDURE — 99214 OFFICE O/P EST MOD 30 MIN: CPT | Mod: S$GLB,,, | Performed by: NURSE PRACTITIONER

## 2022-12-14 PROCEDURE — 99214 PR OFFICE/OUTPT VISIT, EST, LEVL IV, 30-39 MIN: ICD-10-PCS | Mod: S$GLB,,, | Performed by: NURSE PRACTITIONER

## 2022-12-14 PROCEDURE — 3044F HG A1C LEVEL LT 7.0%: CPT | Mod: CPTII,S$GLB,, | Performed by: NURSE PRACTITIONER

## 2022-12-14 PROCEDURE — 3074F SYST BP LT 130 MM HG: CPT | Mod: CPTII,S$GLB,, | Performed by: NURSE PRACTITIONER

## 2022-12-14 PROCEDURE — 99999 PR PBB SHADOW E&M-EST. PATIENT-LVL III: ICD-10-PCS | Mod: PBBFAC,,, | Performed by: NURSE PRACTITIONER

## 2022-12-14 PROCEDURE — 99999 PR PBB SHADOW E&M-EST. PATIENT-LVL III: CPT | Mod: PBBFAC,,, | Performed by: NURSE PRACTITIONER

## 2022-12-14 PROCEDURE — 3008F BODY MASS INDEX DOCD: CPT | Mod: CPTII,S$GLB,, | Performed by: NURSE PRACTITIONER

## 2022-12-14 PROCEDURE — 3079F DIAST BP 80-89 MM HG: CPT | Mod: CPTII,S$GLB,, | Performed by: NURSE PRACTITIONER

## 2022-12-14 PROCEDURE — 1159F MED LIST DOCD IN RCRD: CPT | Mod: CPTII,S$GLB,, | Performed by: NURSE PRACTITIONER

## 2022-12-14 PROCEDURE — 3008F PR BODY MASS INDEX (BMI) DOCUMENTED: ICD-10-PCS | Mod: CPTII,S$GLB,, | Performed by: NURSE PRACTITIONER

## 2022-12-14 PROCEDURE — 3044F PR MOST RECENT HEMOGLOBIN A1C LEVEL <7.0%: ICD-10-PCS | Mod: CPTII,S$GLB,, | Performed by: NURSE PRACTITIONER

## 2022-12-14 NOTE — PROGRESS NOTES
"Subjective:      Patient ID: Gina Adhikari is a 42 y.o. female.    Chief Complaint: Sleep Apnea    HPI  Presents to office for review of AutoPAP therapy. Patient states improved symptoms with use of AutoPAP. Waking up feeling more refreshed when she wears PAP. She is still habituating and needs to work on sleep scheduling and sleep hygiene. She is sleeping on the couch at times. Mind races when trying to fall asleep. Takes klonopin for anxiety. Not scheduling enough sleep time.   Admits to not enough sleep time. She will stay up late or wake up earlier than other people in her house to get stuff done around the house.  May fall asleep watching TV.    Patient Active Problem List   Diagnosis    Class 3 (severe) obesity due to excess calories with serious comorbidity and body mass index (BMI) of 40.0 to 44.9 in adult    Insulin resistance    Obstructive sleep apnea, mild (AHI = 10, RDI = 17)    Vitamin D deficiency    Recurrent mild major depressive disorder with anxiety    Psychophysiologic insomnia    Gastroesophageal reflux disease without esophagitis     Pulse 79   Resp 18   Ht 5' 2" (1.575 m)   Wt 111.3 kg (245 lb 6 oz)   SpO2 98%   BMI 44.88 kg/m²   Body mass index is 44.88 kg/m².    Review of Systems   Respiratory:  Positive for snoring and somnolence.    Psychiatric/Behavioral:  Positive for sleep disturbance.    All other systems reviewed and are negative.  Objective:      Physical Exam  Constitutional:       Appearance: Normal appearance. She is well-developed.   HENT:      Head: Normocephalic and atraumatic.      Nose: Nose normal.      Mouth/Throat:      Pharynx: Oropharynx is clear.   Cardiovascular:      Rate and Rhythm: Normal rate and regular rhythm.      Heart sounds: No murmur heard.    No gallop.   Pulmonary:      Effort: Pulmonary effort is normal.      Breath sounds: Normal breath sounds.   Abdominal:      Palpations: Abdomen is soft. There is no mass.      Tenderness: There is no " HPI:  Nikki Lomax is a 16  y.o. 5  m.o. female who presents with illness.  Fatigue this week, headache.  Body aches.  No sore throat.  Mild cough.  No fever.  She has been fatigued for a week, but today she is actually feeling better.  No sore throat.  Nothing makes this better or worse.  Hx of iron deficiency.  Not taking her iron supplement or vitamins with iron.  She donated blood about 1 1/2 weeks ago.  No known mono exposure.      Past Medical History:   Diagnosis Date    Heavy periods        Past Surgical History:   Procedure Laterality Date    ESOPHAGEAL BRAVO PH N/A 8/14/2015    Performed by [facility]ya Lopez [facility] at [facility] (2ND FLR)    ESOPHAGOGASTRODUODENOSCOPY (EGD) N/A 8/14/2015    Performed by Divya Lopez MD at [facility] (2ND FLR)       Family History   Problem Relation Age of Onset    Heart disease Maternal Grandfather     Hypertension Maternal Grandfather        Social History     Socioeconomic History    Marital status: Single     Spouse name: None    Number of children: None    Years of education: None    Highest education level: None   Social Needs    Financial resource strain: None    Food insecurity - worry: None    Food insecurity - inability: None    Transportation needs - medical: None    Transportation needs - non-medical: None   Occupational History    None   Tobacco Use    Smoking status: Never Smoker    Smokeless tobacco: Never Used   Substance and Sexual Activity    Alcohol use: No    Drug use: No    Sexual activity: No   Other Topics Concern    None   Social History Narrative    Lives with mom, brother (Edmundo), mom's boyfriend.  +Dog.  In school.  No inside smokers.       Patient Active Problem List   Diagnosis    Scoliosis    Gastroesophageal reflux    Reflux    Irregular periods    Fatigue    Iron deficiency    Grade 2 ankle sprain, right, subsequent encounter       Reviewed Past Medical History, Social History, and Family History--  abdominal tenderness.   Musculoskeletal:         General: Normal range of motion.      Cervical back: Normal range of motion and neck supple.   Skin:     General: Skin is warm and dry.   Neurological:      Mental Status: She is alert and oriented to person, place, and time.   Psychiatric:         Mood and Affect: Mood normal.         Behavior: Behavior normal.     Personal Diagnostic Review  APAP download: compliance 42%. AHI 0.3      Assessment:       1. MANJINDER (obstructive sleep apnea)    2. Insomnia, unspecified type    3. Morbid obesity with BMI of 45.0-49.9, adult    4. Lack of adequate sleep    5. Obstructive sleep apnea, mild (AHI = 10, RDI = 17)          Outpatient Encounter Medications as of 12/14/2022   Medication Sig Dispense Refill    buPROPion (WELLBUTRIN XL) 300 MG 24 hr tablet Take 1 tablet (300 mg total) by mouth once daily. 90 tablet 3    cholecalciferol, vitamin D3, (D3-5000) 125 mcg (5,000 unit) capsule Take 1 capsule (5,000 Units total) by mouth once daily. 90 capsule 11    clonazePAM (KLONOPIN) 0.5 MG tablet Take 0.5-1 tablets (0.25-0.5 mg total) by mouth 2 (two) times daily as needed (for anxiety or insomnia). MAX 1.5 TABLETS PER DAY 45 tablet 5    meclizine (ANTIVERT) 25 mg tablet Take 25 mg by mouth 3 (three) times daily as needed.      multivitamin (THERAGRAN) per tablet Take 1 tablet by mouth once daily.      pantoprazole (PROTONIX) 40 MG tablet Take 1 tablet (40 mg total) by mouth once daily. 90 tablet 2     No facility-administered encounter medications on file as of 12/14/2022.     No orders of the defined types were placed in this encounter.    Plan:   Working towards compliance. Work on sleep routine to be more successful to obtaining adequate sleep at night.      Problem List Items Addressed This Visit          Other    Obstructive sleep apnea, mild (AHI = 10, RDI = 17) (Chronic)     Other Visit Diagnoses       MANJINDER (obstructive sleep apnea)    -  Primary    Insomnia, unspecified type      updated as needed    ROS:  Constitutional: ++decreased activity  Head, Ears, Eyes, Nose, Throat: no ear discharge  Respiratory: no difficulty breathing  GI: no vomiting or diarrhea    PHYSICAL EXAM:  APPEARANCE: No acute distress, nontoxic appearing, well appearing, very fair  SKIN: No obvious rashes  HEAD: Nontraumatic  NECK: Supple, no enlarged lymph nodes  EYES: Conjunctivae clear, no discharge  EARS: Clear canals, Tympanic membranes pearly bilaterally  NOSE: No discharge  MOUTH & THROAT:  Moist mucous membranes, No tonsillar enlargement, No pharyngeal erythema or exudates  CHEST: Lungs clear to auscultation, no grunting/flaring/retracting  CARDIOVASCULAR: Regular rate and rhythm without murmur, capillary refill less than 2 seconds  GI: Soft, non tender, non distended, no hepatosplenomegaly  MUSCULOSKELETAL: Moves all extremities well  NEUROLOGIC: alert, interactive      Nikki was seen today for fatigue and headache.    Diagnoses and all orders for this visit:    Fatigue, unspecified type  -     POCT hemoglobin  -     CBC auto differential; Future  -     Iron and TIBC; Future  -     FERRITIN; Future  -     Vitamin D; Future  -     TSH; Future    Iron deficiency  -     POCT hemoglobin  -     CBC auto differential; Future  -     Iron and TIBC; Future  -     FERRITIN; Future    Viral syndrome          ASSESSMENT:  1. Fatigue, unspecified type    2. Iron deficiency    3. Viral syndrome        PLAN:  1.  Restart MVI with iron daily, like a women's one a day.  Screening Hb: 11.7-- decreased from last test, mild anemia-- Will get further Labs today to evaluate fatigue-- CBC, iron studies since hx of iron defic, Vit D, TSH.  However, more likely that she had a viral infection that caused her fatigue, resolving on its own.  No signs of mono--normal tonsils, no enlarged LN, normal spleen size.   Will call mom with results.            Morbid obesity with BMI of 45.0-49.9, adult        Lack of adequate sleep            Weight loss and exercise to improve overall health.  Klonopin if needed.

## 2023-01-06 ENCOUNTER — OFFICE VISIT (OUTPATIENT)
Dept: PSYCHIATRY | Facility: CLINIC | Age: 43
End: 2023-01-06
Payer: COMMERCIAL

## 2023-01-06 DIAGNOSIS — F51.04 PSYCHOPHYSIOLOGIC INSOMNIA: ICD-10-CM

## 2023-01-06 DIAGNOSIS — F33.0 RECURRENT MILD MAJOR DEPRESSIVE DISORDER WITH ANXIETY: Chronic | ICD-10-CM

## 2023-01-06 DIAGNOSIS — F41.9 RECURRENT MILD MAJOR DEPRESSIVE DISORDER WITH ANXIETY: Chronic | ICD-10-CM

## 2023-01-06 PROCEDURE — 90791 PSYCH DIAGNOSTIC EVALUATION: CPT | Mod: 95,,, | Performed by: SOCIAL WORKER

## 2023-01-06 PROCEDURE — 90791 PR PSYCHIATRIC DIAGNOSTIC EVALUATION: ICD-10-PCS | Mod: 95,,, | Performed by: SOCIAL WORKER

## 2023-01-06 NOTE — LETTER
January 11, 2023        BECKI Perez MD  [address]  [place] [address]             [facility]  [address]  [place] [address]  Phone: [phone]  Fax: [phone]   Patient: Gina Adhikari   MR Number: [id number]   YOB: 1980   Date of Visit: 1/6/2023       Dear Dr. Perez:    Thank you for referring Gina Adhikari to me for evaluation. Below are the relevant portions of my assessment and plan of care.    If you have questions, please do not hesitate to call me. I look forward to following Gina along with you.    Sincerely,      Riki Fitch, PRISCILLA           CC    No Recipients

## 2023-01-06 NOTE — PROGRESS NOTES
"Psychiatry Initial Visit (PhD/LCSW)  Diagnostic Interview - CPT 40135    Date: 1/6/2023    Site: [place]    Referral source: Marcelo Perez MD    Clinical status of patient: Outpatient    Gina Adhikari, a 42 y.o. female, for initial evaluation visit.  Met with patient.  Virtual visit with synchronous audio and video     Each patient to whom he provides medical services by telemedicine is:  (1) informed of the relationship between the physician and patient and the respective role of any other health care provider with respect to management of the patient; and (2) notified that he or she may decline to receive medical services by telemedicine and may withdraw from such care at any time.      Location:  Her home in [place]    Chief complaint/reason for encounter: depression and anxiety    History of present illness:  Dr. Ruiz encouraged her to come to counseling.  She has been having problems with anxiety and depression.  She will get stuck in ruts.  She is overwhelmed with life.  She has to make sure everyone is good and she is not screwing things up.  She has had poor sleep for a couple of years.  She has crying spells when she "talks about it."  Her  would say she has irritability.  She has not had a panic attack in a long time.  Her appetite has been the same.  She has done emotional eating in the past late at night.  She is not exercising.      She has a hard time getting her head to settle down at night.      Pain: 0    Symptoms:   Mood: depressed mood, insomnia, tearfulness, and overwhelmed  Anxiety: excessive anxiety/worry and irritability  Substance abuse: denied  Cognitive functioning: denied  Health behaviors: noncontributory    Psychiatric history:  She saw Concepcion in her teenage years.  She saw her off and on through high school.  The patient did feel that counseling helped.  It was talking to a friend that did not .  She saw a counselor in [place] near [facility].  It " was an older lady.  She liked her sessions.  A long time ago, she thought of suicide.  She wondered if it would better for others if she was not around.  She last had those thoughts two years ago.      Medical history: She has had a CPAP for four months.  She is not getting a quantity of sleep.  She is sleeping five hours a night.  She was on metformin for a couple of years, but it made her sick.  She had a cholecystectomy.      Family history of psychiatric illness: Her father had depression and anxiety.  Her mother denies having mood problems.  There have been alcoholics on both sides of the family.      Social history (marriage, employment, etc.): Patient's mother, Samara, 74, lives in [place].  She is retired.  She is a .  For over twenty years, she was a  in [facility].  She moved to [place] in .  Her father, [place],  in  due to leukemia.  He was on disability.  He was 60.  He worked off and on.  Her parents  when she was in the fifth grade.  Her father was having an affair.  She is the youngest of three children.  Her older brother, Hloa, 50, lives in [place].  He is  with two children.  He is in [employer].  Her older sister, Brittany, 46, lives near [place], [place]. She is  with one child.  She is a  of a [employer] club in a senior living community.  She is not close to her siblings.  They go months without talking.  The patient grew up in [place].  She lived in [place].  She graduated from [employer] in .  She went to [employer] for a year and a half.  She was pursuing social work.  Her grandmother got ill and she started seeing someone.  She started working at a restaurant.  She was at [employer] for one year.  She worked at [employer].  It did the [employer].  She was with them for about five years.  She was a .  She started working at [employer] .  She was there a  year.  She moved to [place].  She started working again in 2016.  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  She was there for about six years.  She stopped working in December of 2021.  The children were getting difficult to deal with.  She does babysitting for extra money.  Her , Jeffery Rangel, has been  to her for eighteen years.  He is the  at a chemical plant in [facility].  He is the deputy chief of the [employer] fire department.  He is retiring from the [employer] fire department.  They have a good relationship.  He is from [place].  They moved to [place] in 2006.  He was previously  twice.  He has two children from his first marriage, and one child from his second marriage.  The first two were over eighteen when she  her .  The shared custody with his daughter, Camille Wolfe.  She lives in [facility].  She is  with two children.  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  It is the first marriage for the patientt.  They have two children.  Her son, Brandt, 18, is a freshman at hospitals.  He is pursuing mechanical engineering.  He enlisted in [employer].  He lives in the dorms.  Her daughter, Ellen, 15, is a freshman at [employer].  She likes to write, read, and dance.  She has a good relationship with her children.  She was raised Latter day.  She tries to profess that ana.  She goes to [employer] every Sunday.  She goes to [employer].  She believes in God and Nicolas.  She spends time with her family.  She used to read, but she has not been reading recently.  Her biggest hobby is doing laundry.  They go to the hospitals football games.  She has two dogs, Tucks and Laxmi.        Substance use:   Alcohol:  She drinks once every three months.  She will have a beverage.   Drugs: none   Tobacco: none   Caffeine: She drinks one cup of coffee per day.  She will have a 20oz Coke.    Current medications and drug reactions (include  "OTC, herbal): see medication list   She has been on Wellbutrin for a year.  She "thinks" it is helping.  She has been on Klonopin for six months.  She was taking it nightly.  She now takes it four out of the seven days a week.  She is taking it for insomnia.    She has been on Valium, Trintellix, which helped with her mood, but it was expensive.  In high school, she was on Prozac.  It did not work.    She was on Tofranil for a couple of years at 13.  She did find that it helped.      Strengths and liabilities: Strength: Patient accepts guidance/feedback, Strength: Patient is expressive/articulate., Strength: Patient is intelligent., Strength: Patient is motivated for change., Strength: Patient has positive support network., Strength: Patient has reasonable judgment., Strength: Patient is stable., Liability: Patient lacks coping skills.    Current Evaluation:     Mental Status Exam:  General Appearance:  age appropriate, casually dressed, neatly groomed   Speech: normal tone, normal rate, normal pitch, normal volume      Level of Cooperation: cooperative      Thought Processes: normal and logical   Mood: anxious, sad      Thought Content: normal, no suicidality, no homicidality, delusions, or paranoia   Affect: anxious   Orientation: Oriented x3   Memory: recent >  intact, remote >  intact   Attention Span & Concentration: intact   Fund of General Knowledge: intact and appropriate to age and level of education   Abstract Reasoning:    Judgment & Insight: fair     Language  intact     Diagnostic Impression - Plan:       ICD-10-CM ICD-9-CM   1. Recurrent mild major depressive disorder with anxiety  F33.0 296.31    F41.9 300.00   2. Psychophysiologic insomnia  F51.04 307.42       Plan:individual psychotherapy and medication management by physician    Return to Clinic: as scheduled    Length of Service (minutes): 45    "

## 2023-03-06 ENCOUNTER — OFFICE VISIT (OUTPATIENT)
Dept: PSYCHIATRY | Facility: CLINIC | Age: 43
End: 2023-03-06
Payer: COMMERCIAL

## 2023-03-06 DIAGNOSIS — F33.1 MAJOR DEPRESSIVE DISORDER, RECURRENT EPISODE, MODERATE: Primary | ICD-10-CM

## 2023-03-06 PROCEDURE — 90834 PR PSYCHOTHERAPY W/PATIENT, 45 MIN: ICD-10-PCS | Mod: 95,,, | Performed by: SOCIAL WORKER

## 2023-03-06 PROCEDURE — 90834 PSYTX W PT 45 MINUTES: CPT | Mod: 95,,, | Performed by: SOCIAL WORKER

## 2023-03-06 NOTE — PROGRESS NOTES
"Individual Psychotherapy (PhD/LCSW)    3/6/2023    Site:  [facility]         Therapeutic Intervention: Met with patient.  Outpatient - Insight oriented psychotherapy 45 min - CPT code 48535   Virtual visit with synchronous audio and video      Each patient to whom he provides medical services by telemedicine is:  (1) informed of the relationship between the physician and patient and the respective role of any other health care provider with respect to management of the patient; and (2) notified that he or she may decline to receive medical services by telemedicine and may withdraw from such care at any time.       Location:  Her home in [place]    Chief complaint/reason for encounter: depression and anxiety     Interval history and content of current session:  Patient presents virtually for ongoing counseling due to depression and anxiety.  She has been worrying about everything.  She is worried about not "measuring up."  She recalls not being the best student in school.  She had two siblings who were very book smart.  She thinks her father thought she was lazy.  Her mother thought there was something wrong.  When she was a puneet in high school, she found out she had dyslexia.  The school did not want to help.  She had teachers who tried to help.  Her father had checked out by the time she was in high school.  Her sister "matter" because she was untouchable and angry with him.  "When he needed me to make him look good with a new woman, I was there.  I decided I needed stop being angry in my twenties."  She wanted a different relationship with her children.  He didn't really step up prior to his death.  She had an adult conversation with him.  She said she had a dream where she threw things at him and he disappeared.  She listed examples of how he had not been there for her and catching him with another woman while he was .  Emphasize that the negative statements and experiences she had as a child influence " "her current internal dialogue.  Explore the idea of writing a "good bye letter" to her  father.  Note that perfection is a myth.  Educate the patient about not "shoulding" on herself or someone else.  Her father blamed everyone else for his problems.  He said his mother made it difficult and she was hard to love, and his father cheated.  Her mother said that her father was a hands on father when they were babies.  He was good at putting on a facade.  Her mother seldom says "I love you.'  She feels that actions speak louder than words.  Her mom has a thought or opinion about what the patient "should" do.  Her mom has a lot of anxiety.  Her siblings are hostile to their mother.  They blame their mother for problems.  Her brother wanted to enlist in the , but their mother did not.  Her mother was not allowed to go out in college because she had to stay in and study.         Treatment plan:  Target symptoms: depression  Why chosen therapy is appropriate versus another modality: relevant to diagnosis  Outcome monitoring methods: self-report, observation  Therapeutic intervention type: insight oriented psychotherapy, supportive psychotherapy, interactive psychotherapy    Risk parameters:  Patient reports no suicidal ideation  Patient reports no homicidal ideation  Patient reports no self-injurious behavior  Patient reports no violent behavior    Verbal deficits: None    Patient's response to intervention:  The patient's response to intervention is accepting, motivated.    Progress toward goals and other mental status changes:  The patient's progress toward goals is fair .    Diagnosis:   Major Depression recurrent moderate    Plan:  individual psychotherapy and medication management by physician    Return to clinic: as scheduled    Length of Service (minutes): 45        "

## 2023-03-22 ENCOUNTER — OFFICE VISIT (OUTPATIENT)
Dept: SLEEP MEDICINE | Facility: CLINIC | Age: 43
End: 2023-03-22
Payer: COMMERCIAL

## 2023-03-22 VITALS
BODY MASS INDEX: 44.95 KG/M2 | DIASTOLIC BLOOD PRESSURE: 76 MMHG | SYSTOLIC BLOOD PRESSURE: 112 MMHG | HEART RATE: 89 BPM | HEIGHT: 62 IN | RESPIRATION RATE: 18 BRPM | WEIGHT: 244.25 LBS | OXYGEN SATURATION: 98 %

## 2023-03-22 DIAGNOSIS — G47.33 OBSTRUCTIVE SLEEP APNEA: Primary | Chronic | ICD-10-CM

## 2023-03-22 DIAGNOSIS — E66.01 MORBID OBESITY WITH BMI OF 40.0-44.9, ADULT: ICD-10-CM

## 2023-03-22 DIAGNOSIS — F41.9 ANXIETY: ICD-10-CM

## 2023-03-22 DIAGNOSIS — F51.04 PSYCHOPHYSIOLOGIC INSOMNIA: Chronic | ICD-10-CM

## 2023-03-22 PROCEDURE — 1159F PR MEDICATION LIST DOCUMENTED IN MEDICAL RECORD: ICD-10-PCS | Mod: CPTII,S$GLB,, | Performed by: NURSE PRACTITIONER

## 2023-03-22 PROCEDURE — 99214 PR OFFICE/OUTPT VISIT, EST, LEVL IV, 30-39 MIN: ICD-10-PCS | Mod: S$GLB,,, | Performed by: NURSE PRACTITIONER

## 2023-03-22 PROCEDURE — 99214 OFFICE O/P EST MOD 30 MIN: CPT | Mod: S$GLB,,, | Performed by: NURSE PRACTITIONER

## 2023-03-22 PROCEDURE — 99999 PR PBB SHADOW E&M-EST. PATIENT-LVL IV: CPT | Mod: PBBFAC,,, | Performed by: NURSE PRACTITIONER

## 2023-03-22 PROCEDURE — 3078F PR MOST RECENT DIASTOLIC BLOOD PRESSURE < 80 MM HG: ICD-10-PCS | Mod: CPTII,S$GLB,, | Performed by: NURSE PRACTITIONER

## 2023-03-22 PROCEDURE — 1159F MED LIST DOCD IN RCRD: CPT | Mod: CPTII,S$GLB,, | Performed by: NURSE PRACTITIONER

## 2023-03-22 PROCEDURE — 1160F RVW MEDS BY RX/DR IN RCRD: CPT | Mod: CPTII,S$GLB,, | Performed by: NURSE PRACTITIONER

## 2023-03-22 PROCEDURE — 3074F PR MOST RECENT SYSTOLIC BLOOD PRESSURE < 130 MM HG: ICD-10-PCS | Mod: CPTII,S$GLB,, | Performed by: NURSE PRACTITIONER

## 2023-03-22 PROCEDURE — 3008F BODY MASS INDEX DOCD: CPT | Mod: CPTII,S$GLB,, | Performed by: NURSE PRACTITIONER

## 2023-03-22 PROCEDURE — 3074F SYST BP LT 130 MM HG: CPT | Mod: CPTII,S$GLB,, | Performed by: NURSE PRACTITIONER

## 2023-03-22 PROCEDURE — 3008F PR BODY MASS INDEX (BMI) DOCUMENTED: ICD-10-PCS | Mod: CPTII,S$GLB,, | Performed by: NURSE PRACTITIONER

## 2023-03-22 PROCEDURE — 3078F DIAST BP <80 MM HG: CPT | Mod: CPTII,S$GLB,, | Performed by: NURSE PRACTITIONER

## 2023-03-22 PROCEDURE — 99999 PR PBB SHADOW E&M-EST. PATIENT-LVL IV: ICD-10-PCS | Mod: PBBFAC,,, | Performed by: NURSE PRACTITIONER

## 2023-03-22 PROCEDURE — 1160F PR REVIEW ALL MEDS BY PRESCRIBER/CLIN PHARMACIST DOCUMENTED: ICD-10-PCS | Mod: CPTII,S$GLB,, | Performed by: NURSE PRACTITIONER

## 2023-03-22 NOTE — PROGRESS NOTES
"Subjective:      Patient ID: Gina Adhikari is a 43 y.o. female.    Chief Complaint: Sleep Apnea    HPI  Presents for sleep apnea. She is obtaining more sleep, although not quite 7-8 hours. She is taking Klonopin for anxiety and to help her sleep. She takes this on most nights.  She is waking up more refreshed on PAP and benefits from use.    Patient Active Problem List   Diagnosis    Class 3 (severe) obesity due to excess calories with serious comorbidity and body mass index (BMI) of 40.0 to 44.9 in adult    Insulin resistance    Obstructive sleep apnea, mild (AHI = 10, RDI = 17)    Vitamin D deficiency    Recurrent mild major depressive disorder with anxiety    Psychophysiologic insomnia    Gastroesophageal reflux disease without esophagitis     /76   Pulse 89   Resp 18   Ht 5' 2" (1.575 m)   Wt 110.8 kg (244 lb 4.3 oz)   SpO2 98%   BMI 44.68 kg/m²   Body mass index is 44.68 kg/m².    Review of Systems   Constitutional: Negative.    HENT: Negative.     Respiratory: Negative.     Cardiovascular: Negative.    Musculoskeletal: Negative.    Gastrointestinal: Negative.    Neurological: Negative.    Psychiatric/Behavioral:  Positive for sleep disturbance. The patient is nervous/anxious.    Objective:      Physical Exam  Constitutional:       Appearance: She is well-developed. She is obese.   HENT:      Head: Normocephalic and atraumatic.      Nose: Nose normal.      Mouth/Throat:      Pharynx: Oropharynx is clear.   Cardiovascular:      Rate and Rhythm: Normal rate and regular rhythm.      Heart sounds: No murmur heard.    No gallop.   Pulmonary:      Effort: Pulmonary effort is normal.      Breath sounds: Normal breath sounds.   Abdominal:      Palpations: Abdomen is soft. There is no mass.      Tenderness: There is no abdominal tenderness.   Musculoskeletal:         General: Normal range of motion.      Cervical back: Normal range of motion and neck supple.   Skin:     General: Skin is warm and dry. "   Neurological:      Mental Status: She is alert and oriented to person, place, and time.   Psychiatric:         Mood and Affect: Mood normal.         Behavior: Behavior normal.     Personal Diagnostic Review  Autopap download: Patient wears on average 5.5 hrs. Greater than 4 hrs 87 % of the time. 90% percentile pressure 8.5 with AHI 0.2 events/hr      Assessment:       1. Obstructive sleep apnea, mild (AHI = 10, RDI = 17)    2. Psychophysiologic insomnia    3. Anxiety    4. Morbid obesity with BMI of 40.0-44.9, adult          Outpatient Encounter Medications as of 3/22/2023   Medication Sig Dispense Refill    buPROPion (WELLBUTRIN XL) 300 MG 24 hr tablet Take 1 tablet (300 mg total) by mouth once daily. 90 tablet 3    cholecalciferol, vitamin D3, (D3-5000) 125 mcg (5,000 unit) capsule Take 1 capsule (5,000 Units total) by mouth once daily. 90 capsule 11    clonazePAM (KLONOPIN) 0.5 MG tablet Take 0.5-1 tablets (0.25-0.5 mg total) by mouth 2 (two) times daily as needed (for anxiety or insomnia). MAX 1.5 TABLETS PER DAY 45 tablet 5    meclizine (ANTIVERT) 25 mg tablet Take 25 mg by mouth 3 (three) times daily as needed.      multivitamin (THERAGRAN) per tablet Take 1 tablet by mouth once daily.      pantoprazole (PROTONIX) 40 MG tablet Take 1 tablet (40 mg total) by mouth once daily. 90 tablet 2     No facility-administered encounter medications on file as of 3/22/2023.     Orders Placed This Encounter   Procedures    CPAP/BIPAP SUPPLIES     Order Specific Question:   Length of need (1-99 months):     Answer:   99     Order Specific Question:   Choose ONE mask type and its corresponding cushions and/or pillows:     Answer:    Nasal Mask, 1 per 90 days:  Nasal Cushions, (6 per 90 days):  Nasal Pillows, (6 per 90 days)     Order Specific Question:   Choose EITHER Heated or Non-Heated Tubjing     Answer:    Non-Heated Tubing, 1 per 90 days     Order Specific Question:   All other supplies as needed  as listed below:     Answer:    Headgear, 1 per 180 days     Order Specific Question:   All other supplies as needed as listed below:     Answer:    Disposable Filter, 6 per 90 days     Order Specific Question:   All other supplies as needed as listed below:     Answer:    Non-Disposable Filter, 1 per 180 days     Order Specific Question:   All other supplies as needed as listed below:     Answer:    Humidifier Chamber, 1 per 180 days     Order Specific Question:   All other supplies as needed as listed below:     Answer:    Chin Strap, 1 per 180 days     Plan:      Improved sleep time and APAP usage.   Follow up one year.  Compliant with PAP and benefits from use. Follow up annually in the sleep clinic.  Weight loss and exercise to improve overall health.    Problem List Items Addressed This Visit          Other    Obstructive sleep apnea, mild (AHI = 10, RDI = 17) - Primary (Chronic)    Relevant Orders    CPAP/BIPAP SUPPLIES    Psychophysiologic insomnia (Chronic)     Other Visit Diagnoses       Anxiety        Morbid obesity with BMI of 40.0-44.9, adult                             Elizabeth LeJeune, ACNP, ANP

## 2023-07-14 ENCOUNTER — LAB VISIT (OUTPATIENT)
Dept: LAB | Facility: HOSPITAL | Age: 43
End: 2023-07-14
Attending: FAMILY MEDICINE
Payer: COMMERCIAL

## 2023-07-14 ENCOUNTER — OFFICE VISIT (OUTPATIENT)
Dept: INTERNAL MEDICINE | Facility: CLINIC | Age: 43
End: 2023-07-14
Payer: COMMERCIAL

## 2023-07-14 VITALS
OXYGEN SATURATION: 98 % | HEART RATE: 90 BPM | RESPIRATION RATE: 16 BRPM | HEIGHT: 62 IN | WEIGHT: 241.88 LBS | DIASTOLIC BLOOD PRESSURE: 82 MMHG | BODY MASS INDEX: 44.51 KG/M2 | TEMPERATURE: 99 F | SYSTOLIC BLOOD PRESSURE: 120 MMHG

## 2023-07-14 DIAGNOSIS — R35.0 URINARY FREQUENCY: ICD-10-CM

## 2023-07-14 DIAGNOSIS — K21.9 GASTROESOPHAGEAL REFLUX DISEASE WITHOUT ESOPHAGITIS: ICD-10-CM

## 2023-07-14 DIAGNOSIS — Z00.01 ENCOUNTER FOR WELL ADULT EXAM WITH ABNORMAL FINDINGS: Primary | ICD-10-CM

## 2023-07-14 DIAGNOSIS — F50.81 BINGE EATING DISORDER: ICD-10-CM

## 2023-07-14 DIAGNOSIS — R39.15 URINARY URGENCY: ICD-10-CM

## 2023-07-14 DIAGNOSIS — E66.01 CLASS 3 SEVERE OBESITY DUE TO EXCESS CALORIES WITH SERIOUS COMORBIDITY AND BODY MASS INDEX (BMI) OF 40.0 TO 44.9 IN ADULT: Chronic | ICD-10-CM

## 2023-07-14 DIAGNOSIS — E55.9 VITAMIN D DEFICIENCY: Chronic | ICD-10-CM

## 2023-07-14 DIAGNOSIS — F33.0 RECURRENT MILD MAJOR DEPRESSIVE DISORDER WITH ANXIETY: Chronic | ICD-10-CM

## 2023-07-14 DIAGNOSIS — G47.33 OBSTRUCTIVE SLEEP APNEA: Chronic | ICD-10-CM

## 2023-07-14 DIAGNOSIS — Z12.31 BREAST CANCER SCREENING BY MAMMOGRAM: ICD-10-CM

## 2023-07-14 DIAGNOSIS — F41.9 RECURRENT MILD MAJOR DEPRESSIVE DISORDER WITH ANXIETY: Chronic | ICD-10-CM

## 2023-07-14 DIAGNOSIS — F51.04 PSYCHOPHYSIOLOGIC INSOMNIA: ICD-10-CM

## 2023-07-14 LAB
BACTERIA #/AREA URNS HPF: ABNORMAL /HPF
BILIRUB UR QL STRIP: NEGATIVE
CLARITY UR: CLEAR
COLOR UR: YELLOW
GLUCOSE UR QL STRIP: NEGATIVE
HGB UR QL STRIP: NEGATIVE
KETONES UR QL STRIP: NEGATIVE
LEUKOCYTE ESTERASE UR QL STRIP: NEGATIVE
MICROSCOPIC COMMENT: ABNORMAL
NITRITE UR QL STRIP: POSITIVE
PH UR STRIP: 6 [PH] (ref 5–8)
PROT UR QL STRIP: NEGATIVE
RBC #/AREA URNS HPF: 0 /HPF (ref 0–4)
SP GR UR STRIP: >=1.03 (ref 1–1.03)
SQUAMOUS #/AREA URNS HPF: 6 /HPF
URN SPEC COLLECT METH UR: ABNORMAL
WBC #/AREA URNS HPF: 5 /HPF (ref 0–5)

## 2023-07-14 PROCEDURE — 3008F PR BODY MASS INDEX (BMI) DOCUMENTED: ICD-10-PCS | Mod: CPTII,S$GLB,, | Performed by: FAMILY MEDICINE

## 2023-07-14 PROCEDURE — 81000 URINALYSIS NONAUTO W/SCOPE: CPT | Performed by: FAMILY MEDICINE

## 2023-07-14 PROCEDURE — 99999 PR PBB SHADOW E&M-EST. PATIENT-LVL IV: ICD-10-PCS | Mod: PBBFAC,,, | Performed by: FAMILY MEDICINE

## 2023-07-14 PROCEDURE — 3079F PR MOST RECENT DIASTOLIC BLOOD PRESSURE 80-89 MM HG: ICD-10-PCS | Mod: CPTII,S$GLB,, | Performed by: FAMILY MEDICINE

## 2023-07-14 PROCEDURE — 3074F SYST BP LT 130 MM HG: CPT | Mod: CPTII,S$GLB,, | Performed by: FAMILY MEDICINE

## 2023-07-14 PROCEDURE — 99396 PR PREVENTIVE VISIT,EST,40-64: ICD-10-PCS | Mod: S$GLB,,, | Performed by: FAMILY MEDICINE

## 2023-07-14 PROCEDURE — 3008F BODY MASS INDEX DOCD: CPT | Mod: CPTII,S$GLB,, | Performed by: FAMILY MEDICINE

## 2023-07-14 PROCEDURE — 99214 OFFICE O/P EST MOD 30 MIN: CPT | Mod: 25,S$GLB,, | Performed by: FAMILY MEDICINE

## 2023-07-14 PROCEDURE — 3079F DIAST BP 80-89 MM HG: CPT | Mod: CPTII,S$GLB,, | Performed by: FAMILY MEDICINE

## 2023-07-14 PROCEDURE — 3074F PR MOST RECENT SYSTOLIC BLOOD PRESSURE < 130 MM HG: ICD-10-PCS | Mod: CPTII,S$GLB,, | Performed by: FAMILY MEDICINE

## 2023-07-14 PROCEDURE — 1159F MED LIST DOCD IN RCRD: CPT | Mod: CPTII,S$GLB,, | Performed by: FAMILY MEDICINE

## 2023-07-14 PROCEDURE — 99396 PREV VISIT EST AGE 40-64: CPT | Mod: S$GLB,,, | Performed by: FAMILY MEDICINE

## 2023-07-14 PROCEDURE — 1159F PR MEDICATION LIST DOCUMENTED IN MEDICAL RECORD: ICD-10-PCS | Mod: CPTII,S$GLB,, | Performed by: FAMILY MEDICINE

## 2023-07-14 PROCEDURE — 99214 PR OFFICE/OUTPT VISIT, EST, LEVL IV, 30-39 MIN: ICD-10-PCS | Mod: 25,S$GLB,, | Performed by: FAMILY MEDICINE

## 2023-07-14 PROCEDURE — 99999 PR PBB SHADOW E&M-EST. PATIENT-LVL IV: CPT | Mod: PBBFAC,,, | Performed by: FAMILY MEDICINE

## 2023-07-14 RX ORDER — PANTOPRAZOLE SODIUM 40 MG/1
40 TABLET, DELAYED RELEASE ORAL DAILY
Qty: 90 TABLET | Refills: 3 | Status: SHIPPED | OUTPATIENT
Start: 2023-07-14 | End: 2024-07-13

## 2023-07-14 RX ORDER — CLONAZEPAM 0.5 MG/1
0.5 TABLET ORAL DAILY PRN
Qty: 30 TABLET | Refills: 5 | Status: SHIPPED | OUTPATIENT
Start: 2023-07-14 | End: 2024-07-13

## 2023-07-14 RX ORDER — CHOLECALCIFEROL (VITAMIN D3) 125 MCG
5000 CAPSULE ORAL DAILY
COMMUNITY
Start: 2023-07-14

## 2023-07-14 RX ORDER — LISDEXAMFETAMINE DIMESYLATE 30 MG/1
30 CAPSULE ORAL EVERY MORNING
Qty: 30 CAPSULE | Refills: 0 | Status: SHIPPED | OUTPATIENT
Start: 2023-07-14

## 2023-07-14 RX ORDER — ESCITALOPRAM OXALATE 10 MG/1
10 TABLET ORAL DAILY
Qty: 30 TABLET | Refills: 2 | Status: SHIPPED | OUTPATIENT
Start: 2023-07-14 | End: 2023-07-14 | Stop reason: CLARIF

## 2023-07-14 NOTE — ASSESSMENT & PLAN NOTE
1. Recurrent episodes of binge eating. An episode of binge eating is characterized by:  1. Eating, in a discrete period of time (for example, within any 2-hour period), an amount of food that is definitely larger than what most people would eat in a similar period of time under similar circumstances.; AND  2. A sense of lack of control over eating during the episode (for example, a feeling that one cannot stop eating or control what or how much one is eating).  2. The binge-eating episodes are associated with all of the following.  1. Eating much more rapidly than normal.  2. Eating until feeling uncomfortably full.  3. Eating large amounts of food when not feeling physically hungry.  4. Eating alone because of feeling embarrassed by how much one is eating.   5. The binge eating occurs, on average, at least once a week for three months.  6. The binge eating is not associated with the recurrent use of inappropriate compensatory behaviors (as in bulimia nervosa) and does not occur exclusively during the course of bulimia nervosa or anorexia nervosa.

## 2023-07-14 NOTE — ASSESSMENT & PLAN NOTE
Lab Results   Component Value Date    [id number] 22 (L) 11/30/2022    [id number] 21 (L) 11/11/2021

## 2023-07-14 NOTE — PROGRESS NOTES
"ANNUAL WELLNESS VISIT (PREVENTIVE SERVICES)  7/14/23  2:20 PM CDT    Subjective   CHIEF COMPLAINT: Annual Exam      HEALTH MAINTENANCE INTERVENTIONS - UP TO DATE  Health Maintenance Topics with due status: Not Due       Topic Last Completion Date    Cervical Cancer Screening 07/25/2022    Hemoglobin A1c (Diabetic Prevention Screening) 11/30/2022    Influenza Vaccine 12/03/2022       HEALTH MAINTENANCE INTERVENTIONS - DUE OR DUE SOON  Health Maintenance Due   Topic Date Due    TETANUS VACCINE  Never done    COVID-19 Vaccine (4 - Moderna series) 04/08/2022    Mammogram  07/25/2023        She comes in for an annual wellness visit and preventive services. She will be due for breast cancer screening later this month. A screening mammogram was ordered. She is up to date with other cancer screenings. She is up to date with screening for dyslipidemia and diabetes. Screening for hypertension is negative. Screening for obesity is positive. This is addressed elsewhere. Screening for depression is positive. This is addressed elsewhere. Therapeutic lifestyle changes recommended.    She also requests evaluation and management of unrelated problems, reported separately.    Review of Systems   Respiratory:  Negative for chest tightness and shortness of breath.    Cardiovascular:  Negative for chest pain.   Endocrine: Negative for polydipsia and polyuria.       Objective   Vitals:    07/14/23 1421   BP: 120/82   BP Location: Left arm   Patient Position: Sitting   BP Method: Large (Manual)   Pulse: 90   Resp: 16   Temp: 98.6 °F (37 °C)   SpO2: 98%   Weight: 109.7 kg (241 lb 13.5 oz)   Height: 5' 2" (1.575 m)   Physical Exam  Vitals reviewed.   Constitutional:       General: She is not in acute distress.     Appearance: Normal appearance. She is not ill-appearing, toxic-appearing or diaphoretic.   HENT:      Head: Normocephalic and atraumatic.   Eyes:      General: No scleral icterus.     Conjunctiva/sclera: Conjunctivae normal. " "  Cardiovascular:      Rate and Rhythm: Normal rate and regular rhythm.   Pulmonary:      Effort: Pulmonary effort is normal.      Breath sounds: Normal breath sounds.   Abdominal:      Palpations: Abdomen is soft.      Tenderness: There is no abdominal tenderness.   Skin:     General: Skin is warm and dry.   Neurological:      Mental Status: She is alert and oriented to person, place, and time. Mental status is at baseline.   Psychiatric:         Mood and Affect: Mood normal.         Behavior: Behavior normal.         Judgment: Judgment normal.        Assessment and Plan   1. Encounter for well adult exam with abnormal findings    2. Breast cancer screening by mammogram  -     Mammo Digital Screening Bilat w/ Jaquan; Future; Expected date: 07/14/2023     Documentation entered by me for this encounter may have been done in part using speech-recognition technology. Although I have made an effort to ensure accuracy, "sound like" errors may exist and should be interpreted in context.    ADDITIONAL E&M SERVICES PROVIDED - UNRELATED TO PREVENTIVE SERVICES  7/14/23  2:20 PM CDT    In addition to and unrelated to the preventive services provided this date, the following condition(s) were also evaluated and managed.    CHIEF COMPLAINT: Urinary Problems    HPI: She reports a few months' history of recurring episodes of urinary urgency and frequency. She was evaluated at urgent care and was told that she did not have a urinary infection, but they gave her antibiotics anyway, which seemed to make no difference. She mentioned this to her gynecologist earlier today, but he did not order a urinalysis. It was agreed that we would proceed with a urinalysis and treat if positive for a urinary infection. Otherwise, she would discuss this with her gynecologist at her follow-up appointment.    She reports persistent mild depressive symptoms and mild anxiety symptoms, exacerbated by her children growing up and moving out of the house. She " "weaned herself off the bupropion, feeling that it was ineffective. She takes clonazepam very infrequently. Further questioning reveals that she has a binge eating disorder, meeting DSM-5 diagnostic criteria, described below. She struggles with obesity. She has no history of bipolar disorder or hypomania symptoms. We discussed the risks and benefits of treatment options. It was agreed to proceed with pharmacotherapy with Vyvanse and a referral to a lifestyle and wellness program. She says that the pantoprazole manages her GERD symptoms very well. She says she has been compliant with her CPAP and she is taking a vitamin D supplement as instructed.    PHYSICAL EXAM  Vitals:    07/14/23 1421   BP: 120/82   BP Location: Left arm   Patient Position: Sitting   BP Method: Large (Manual)   Pulse: 90   Resp: 16   Temp: 98.6 °F (37 °C)   SpO2: 98%   Weight: 109.7 kg (241 lb 13.5 oz)   Height: 5' 2" (1.575 m)   CONSTITUTIONAL: Vital signs noted. No apparent distress. Does not appear acutely ill or septic. Appears adequately hydrated.  PULM: Lungs clear. Breathing unlabored.  HEART: Regular.  ABDOMEN: Soft and nontender. Bowel sounds present.  DERM: Skin warm and moist with normal turgor.  NEURO: There are no gross focal motor deficits.  PSYCHIATRIC: Alert and conversant. Mood grossly neutral. Judgment and insight grossly intact.     Problem List Items Addressed This Visit       Class 3 (severe) obesity due to excess calories with serious comorbidity and body mass index (BMI) of 40.0 to 44.9 in adult (Chronic)    Relevant Orders    Ambulatory referral/consult to Lifestyle and Wellness    Obstructive sleep apnea, mild (AHI = 10, RDI = 17) (Chronic)    Recurrent mild major depressive disorder with anxiety (Chronic)    Relevant Medications    clonazePAM (KLONOPIN) 0.5 MG tablet    lisdexamfetamine (VYVANSE) 30 MG capsule    Psychophysiologic insomnia (Chronic)    Relevant Medications    clonazePAM (KLONOPIN) 0.5 MG tablet    " Gastroesophageal reflux disease without esophagitis (Chronic)    Relevant Medications    pantoprazole (PROTONIX) 40 MG tablet    Vitamin D deficiency (Chronic)    Current Assessment & Plan     Lab Results   Component Value Date    ECBCFGEZ57FX 22 (L) 11/30/2022    [id number] 21 (L) 11/11/2021             Relevant Medications    cholecalciferol, vitamin D3, (D3-5000) 125 mcg (5,000 unit) capsule    Other Relevant Orders    Vitamin D    Binge eating disorder (Chronic)    Current Assessment & Plan     Recurrent episodes of binge eating. An episode of binge eating is characterized by:  Eating, in a discrete period of time (for example, within any 2-hour period), an amount of food that is definitely larger than what most people would eat in a similar period of time under similar circumstances.; AND  A sense of lack of control over eating during the episode (for example, a feeling that one cannot stop eating or control what or how much one is eating).  The binge-eating episodes are associated with all of the following.  Eating much more rapidly than normal.  Eating until feeling uncomfortably full.  Eating large amounts of food when not feeling physically hungry.  Eating alone because of feeling embarrassed by how much one is eating.   The binge eating occurs, on average, at least once a week for three months.  The binge eating is not associated with the recurrent use of inappropriate compensatory behaviors (as in bulimia nervosa) and does not occur exclusively during the course of bulimia nervosa or anorexia nervosa.         Relevant Medications    lisdexamfetamine (VYVANSE) 30 MG capsule    Urinary urgency    Relevant Orders    Urinalysis    Urinary frequency    Relevant Orders    Urinalysis   Unless noted herein, any chronic conditions are represented as and appear compensated/controlled and stable, and no other significant complaints or concerns were reported.    Follow up in about 2 weeks (around 7/28/2023) for  "virtual visit, review test results, discuss treatment plan, re-evaluation.  Documentation entered by me for this encounter may have been done in part using speech-recognition technology. Although I have made an effort to ensure accuracy, "sound like" errors may exist and should be interpreted in context.    "

## 2023-07-14 NOTE — ASSESSMENT & PLAN NOTE
Lab Results   Component Value Date    HGBA1C 5.3 11/30/2022    HGBA1C 5.4 11/11/2021    GLU 88 11/30/2022    GLU 95 11/11/2021     No results found for: GLUCFAST, VJOP1ZC, QCAT9QO  Lab Results   Component Value Date    LABINSU 7.8 11/30/2022

## 2023-07-16 NOTE — PROGRESS NOTES
Your urinalysis result is OK. Although not completely normal, your result does NOT suggest a kidney infection or something bad.

## 2023-07-19 NOTE — Clinical Note
PHYSICIAN INTERPRETATION AND COMMENTS: Findings are consistent with mild, positional obstructive sleep apnea(MANJINDER). Therapy with CPAP indicated, Please refer to sleep disorders clinic. CLINICAL HISTORY: 42 year old female presented with: 15.75 inch neck, BMI of 45, an [place] sleepiness score of 19, history of depression and symptoms of nocturnal snoring, waking up choking and witnessed apneas. Based on the clinical history, the patient has a high pre-test probability of having Severe MANJINDER. No

## 2023-10-04 NOTE — TELEPHONE ENCOUNTER
No care due was identified.  Health [facility] Embedded Care Due Messages. Reference number: [id number].   10/04/2023 9:06:57 AM CDT

## 2023-10-04 NOTE — TELEPHONE ENCOUNTER
Refill Routing Note   Medication(s) are not appropriate for processing by [facility] for the following reason(s):      New or recently adjusted medication:     [facility] action(s):  Defer Care Due:  None identified   Medication Therapy Plan:         Appointments  past 12m or future 3m with PCP    Date Provider   Last Visit   7/14/2023 BECKI Perez MD   Next Visit   Visit date not found BECKI Perez MD   ED visits in past 90 days: 0        Note composed:12:39 PM 10/04/2023

## 2023-10-06 RX ORDER — ESCITALOPRAM OXALATE 10 MG/1
10 TABLET ORAL DAILY
Qty: 90 TABLET | Refills: 3 | Status: SHIPPED | OUTPATIENT
Start: 2023-10-06

## 2023-10-06 NOTE — TELEPHONE ENCOUNTER
REFILL REQUEST APPROVED.  Requested Prescriptions   Pending Prescriptions Disp Refills    EScitalopram oxalate (LEXAPRO) 10 MG tablet [Pharmacy Med Name: Escitalopram Oxalate 10 MG Oral Tablet] 90 tablet 3     Sig: Take 1 tablet (10 mg total) by mouth once daily.

## 2023-11-13 ENCOUNTER — OFFICE VISIT (OUTPATIENT)
Dept: PRIMARY CARE CLINIC | Facility: CLINIC | Age: 43
End: 2023-11-13
Payer: COMMERCIAL

## 2023-11-13 VITALS
OXYGEN SATURATION: 98 % | BODY MASS INDEX: 43.98 KG/M2 | DIASTOLIC BLOOD PRESSURE: 82 MMHG | WEIGHT: 239 LBS | HEART RATE: 93 BPM | RESPIRATION RATE: 18 BRPM | SYSTOLIC BLOOD PRESSURE: 118 MMHG | TEMPERATURE: 99 F | HEIGHT: 62 IN

## 2023-11-13 DIAGNOSIS — G47.33 OBSTRUCTIVE SLEEP APNEA: Chronic | ICD-10-CM

## 2023-11-13 DIAGNOSIS — K21.9 GASTROESOPHAGEAL REFLUX DISEASE WITHOUT ESOPHAGITIS: Chronic | ICD-10-CM

## 2023-11-13 DIAGNOSIS — F33.0 RECURRENT MILD MAJOR DEPRESSIVE DISORDER WITH ANXIETY: Chronic | ICD-10-CM

## 2023-11-13 DIAGNOSIS — F41.9 RECURRENT MILD MAJOR DEPRESSIVE DISORDER WITH ANXIETY: Chronic | ICD-10-CM

## 2023-11-13 DIAGNOSIS — Z83.3 FAMILY HISTORY OF DIABETES MELLITUS (DM): ICD-10-CM

## 2023-11-13 DIAGNOSIS — E88.819 INSULIN RESISTANCE: Chronic | ICD-10-CM

## 2023-11-13 DIAGNOSIS — E66.01 CLASS 3 SEVERE OBESITY DUE TO EXCESS CALORIES WITH SERIOUS COMORBIDITY AND BODY MASS INDEX (BMI) OF 40.0 TO 44.9 IN ADULT: Chronic | ICD-10-CM

## 2023-11-13 PROCEDURE — 1159F PR MEDICATION LIST DOCUMENTED IN MEDICAL RECORD: ICD-10-PCS | Mod: CPTII,S$GLB,, | Performed by: FAMILY MEDICINE

## 2023-11-13 PROCEDURE — 1159F MED LIST DOCD IN RCRD: CPT | Mod: CPTII,S$GLB,, | Performed by: FAMILY MEDICINE

## 2023-11-13 PROCEDURE — 3008F BODY MASS INDEX DOCD: CPT | Mod: CPTII,S$GLB,, | Performed by: FAMILY MEDICINE

## 2023-11-13 PROCEDURE — 3008F PR BODY MASS INDEX (BMI) DOCUMENTED: ICD-10-PCS | Mod: CPTII,S$GLB,, | Performed by: FAMILY MEDICINE

## 2023-11-13 PROCEDURE — 3079F DIAST BP 80-89 MM HG: CPT | Mod: CPTII,S$GLB,, | Performed by: FAMILY MEDICINE

## 2023-11-13 PROCEDURE — 3074F SYST BP LT 130 MM HG: CPT | Mod: CPTII,S$GLB,, | Performed by: FAMILY MEDICINE

## 2023-11-13 PROCEDURE — 99214 OFFICE O/P EST MOD 30 MIN: CPT | Mod: S$GLB,,, | Performed by: FAMILY MEDICINE

## 2023-11-13 PROCEDURE — 99999 PR PBB SHADOW E&M-EST. PATIENT-LVL V: CPT | Mod: PBBFAC,,, | Performed by: FAMILY MEDICINE

## 2023-11-13 PROCEDURE — 3079F PR MOST RECENT DIASTOLIC BLOOD PRESSURE 80-89 MM HG: ICD-10-PCS | Mod: CPTII,S$GLB,, | Performed by: FAMILY MEDICINE

## 2023-11-13 PROCEDURE — 1160F RVW MEDS BY RX/DR IN RCRD: CPT | Mod: CPTII,S$GLB,, | Performed by: FAMILY MEDICINE

## 2023-11-13 PROCEDURE — 3074F PR MOST RECENT SYSTOLIC BLOOD PRESSURE < 130 MM HG: ICD-10-PCS | Mod: CPTII,S$GLB,, | Performed by: FAMILY MEDICINE

## 2023-11-13 PROCEDURE — 1160F PR REVIEW ALL MEDS BY PRESCRIBER/CLIN PHARMACIST DOCUMENTED: ICD-10-PCS | Mod: CPTII,S$GLB,, | Performed by: FAMILY MEDICINE

## 2023-11-13 PROCEDURE — 99214 PR OFFICE/OUTPT VISIT, EST, LEVL IV, 30-39 MIN: ICD-10-PCS | Mod: S$GLB,,, | Performed by: FAMILY MEDICINE

## 2023-11-13 PROCEDURE — 99999 PR PBB SHADOW E&M-EST. PATIENT-LVL V: ICD-10-PCS | Mod: PBBFAC,,, | Performed by: FAMILY MEDICINE

## 2023-11-13 RX ORDER — SEMAGLUTIDE 0.25 MG/.5ML
0.25 INJECTION, SOLUTION SUBCUTANEOUS
Qty: 2 ML | Refills: 1 | Status: SHIPPED | OUTPATIENT
Start: 2023-11-13

## 2023-11-13 NOTE — PROGRESS NOTES
"Subjective       Referring MD: Chris  PCP: same  BMI noted 43  Diet: variable   Exercise/Activity: light  Sleep: fair  Stressors: none new  Anxiety/Depression Screen/PHQ-2: stable    insurance/[employer] ([facility])  Labs reviewed  Insulin and A1c last year (11/22)  Seen by Екатерина last year      Runs in home : 1-5 children: youngest 3 mos; oldest is 3.     Patient ID: Gina Adhikari is a 43 y.o. female.    Chief Complaint: Follow-up (Follow up)    Hx of IR, MANJINDER, GERD, MDD with anxiety. She was placed on vyvanse and was thought would help with binge eating tendencies. She attributes to stress/anxiety/depression.Tends to be worse in evenings if not sleeping. She was given rx for lexapro but did not initiate.     Hx of IR; failed metformin. She does not recall which version but had diarrhea. She had food fear as a result. GB absent. Strongly refuses to try again.     MANJINDER: has CPAP; partially compliant. Hates her mask.     She has been very tired in the evening.  She does think anxiety helped with urge to eat. Did not worsen anxiety. Energy was good.   Pt has trouble initiating sleep. Was on prn clonazepam.    On protonix for GERD. Medication is helpful.     Has IUD for birth control.     She has tried lifestyle changes and has lost 6-7 lb since last year. Pt states has fear of losing weight--does want to lose but does not want to have excess skin. She considers this "gross." States she would rather be heavy.       Labs reviewed: A1c 5.3, insulin level okay  + fam hx of DM II    Food recall: most days one meal  Bfast:  skips or frozen waffle, no protein, regular syrup  Lunch: often skips  Dinner: burgers, red beans, rare veggies  Pt does not cook veggies because it tends to be only her that eats  Snack: goldfish  Water: inadequate  Sugar Sweetened beverages: caffeinated soda, 1 soft drink daily, 1 small apple juice,   May have 2nd soft drink out at dinner  1/4 bottle sweet tea daily  Sugar " "in coffee: 4-5 tsp  Etoh:  rare    Pt denies reactive hypoglycemia.     is diabetic, was on mounjaro. Lost over 70 lb. Doing well overall. Numbers good.     HPI       Objective   Waist:  "  PAST MEDICAL HISTORY:  Past Medical History:   Diagnosis Date    Anxiety     Arthritis     Class 3 (severe) obesity due to excess calories with serious comorbidity and body mass index (BMI) of 40.0 to 44.9 in adult 2021    Depression          PAST SURGICAL HISTORY:  Past Surgical History:   Procedure Laterality Date     SECTION      GALLBLADDER SURGERY      Removal of Mirena, insertion of Mirena IUD by hysteroscopy  2023       FAMILY HISTORY:  Family History   Problem Relation Age of Onset    Diabetes Mother     Hypertension Mother     Cancer Mother     Cancer Father     Asthma Maternal Grandmother           SOCIAL HISTORY:  Social History     Social History Narrative    Not on file       MEDICATIONS:  Medications have been reviewed.    ALLERGIES:  Allergies have been reviewed.    Vitals:    23 1454   BP: 118/82   Pulse: 93   Resp: 18   Temp: 98.5 °F (36.9 °C)     Wt Readings from Last 10 Encounters:   23 108.4 kg (239 lb)   23 109.8 kg (242 lb)   23 109.7 kg (241 lb 13.5 oz)   23 109.8 kg (242 lb)   23 110.8 kg (244 lb 4.3 oz)   22 111.3 kg (245 lb 6 oz)   22 111.4 kg (245 lb 9.5 oz)   22 111.8 kg (246 lb 7.6 oz)   22 111 kg (244 lb 11.4 oz)   22 111.6 kg (246 lb)       Lab Results   Component Value Date    WBC 5.15 2021    HGB 13.0 2021    HCT 39.6 2021     2021    CHOL 167 2022    TRIG 85 2022    HDL 40 2022    ALT 21 2022    AST 16 2022     2022    K 4.1 2022     2022    CREATININE 0.8 2022    BUN 10 2022    CO2 25 2022    TSH 1.982 2021    HGBA1C 5.3 2022       Review of Systems   Constitutional:  Negative for " activity change, appetite change, fatigue and fever.   HENT:  Negative for mouth dryness and goiter.    Eyes:  Negative for visual disturbance.   Respiratory:  Negative for apnea, cough, chest tightness and shortness of breath.    Cardiovascular:  Negative for chest pain, palpitations and leg swelling.   Gastrointestinal:  Negative for abdominal pain, constipation, diarrhea, nausea, vomiting and reflux.   Endocrine: Negative for cold intolerance, heat intolerance, polydipsia, polyphagia and polyuria.   Genitourinary:  Negative for frequency and menstrual problem.   Musculoskeletal:  Negative for arthralgias and myalgias.   Integumentary:  Negative for color change and rash.   Neurological:  Negative for dizziness, vertigo, tremors, seizures, syncope, weakness and headaches.   Psychiatric/Behavioral:  Negative for self-injury, sleep disturbance and suicidal ideas. The patient is not nervous/anxious.        Physical Exam  Vitals and nursing note reviewed.   Constitutional:       General: She is not in acute distress.  HENT:      Head: Normocephalic and atraumatic.      Mouth/Throat:      Pharynx: Oropharynx is clear.   Eyes:      General: No scleral icterus.     Pupils: Pupils are equal, round, and reactive to light.   Neck:      Comments: No TM  Cardiovascular:      Rate and Rhythm: Normal rate and regular rhythm.      Pulses: Normal pulses.      Heart sounds: Normal heart sounds. No murmur heard.     No friction rub. No gallop.   Pulmonary:      Effort: Pulmonary effort is normal. No respiratory distress.      Breath sounds: Normal breath sounds. No wheezing, rhonchi or rales.   Abdominal:      General: Bowel sounds are normal. There is no distension.      Palpations: Abdomen is soft.      Tenderness: There is no abdominal tenderness.   Musculoskeletal:         General: No swelling.      Cervical back: Normal range of motion and neck supple. No tenderness.   Lymphadenopathy:      Cervical: No cervical adenopathy.    Skin:     General: Skin is warm.      Findings: No erythema or rash.   Neurological:      Mental Status: She is alert and oriented to person, place, and time.   Psychiatric:         Mood and Affect: Mood normal.         Behavior: Behavior normal.              Assessment and Plan     1. Insulin resistance  -     Ambulatory Referral/Consult to Lifestyle Nutrition; Future; Expected date: 11/20/2023  -     semaglutide, weight loss, (WEGOVY) 0.25 mg/0.5 mL PnIj; Inject 0.25 mg into the skin every 7 days.  Dispense: 2 mL; Refill: 1    2. Class 3 (severe) obesity due to excess calories with serious comorbidity and body mass index (BMI) of 40.0 to 44.9 in adult  -     Ambulatory referral/consult to Lifestyle and Wellness  -     Ambulatory Referral/Consult to Lifestyle Nutrition; Future; Expected date: 11/20/2023  -     semaglutide, weight loss, (WEGOVY) 0.25 mg/0.5 mL PnIj; Inject 0.25 mg into the skin every 7 days.  Dispense: 2 mL; Refill: 1    3. Recurrent mild major depressive disorder with anxiety  Comments:  chronic/stable  reviewed with pt if concern BED needs psych eval    4. Gastroesophageal reflux disease without esophagitis  Comments:  chronic/stable on ppi--will continue    5. Obstructive sleep apnea, mild (AHI = 10, RDI = 17)  -     Ambulatory Referral/Consult to Lifestyle Nutrition; Future; Expected date: 11/20/2023    6. Family history of diabetes mellitus (DM)          DW pt food subs--less sugar  Brief review with pt: qsymia or contrave--pt to consider contrave; took wellbutrin in the past; no se's but unsure if helped mood    Follow up in about 8 weeks (around 1/8/2024), or if symptoms worsen or fail to improve.  Inbody: interpreted and dw pt  Smm 66.6  Bmr 1527  PBF 50.6  Visc fat 25    Risks/benefits/common side effects of medication discussed with patient at length. UTD patient handout given. (mychart msg)  D/W pt at length potential pharmacologic options; she desires consideration of ozempic.  Risks/benefits/common side effects of ozempic d/w pt including nausea and pain at injection site; she is aware should not get pregnant while taking and not approved while breastfeeding. NO personal/fam hx of MEN or medullary thyroid cancer. No hx of pancreatitis. Instructed pt how to use with demo pen; pt practiced in office. Pt advised if approved will get pt handout from pharmacy. Pt has no hx of thyroid nodules or biliary disease/gallstones. DW pt with substantial or rapid weight loss gallstones could develop. Also dw pt glp-1 MOA and common side effects.     Also reviewed with pt consideration of monitoring gastroparesis and any mh changes

## 2023-11-13 NOTE — PATIENT INSTRUCTIONS
"Goal: < 25 grams added sugar/day     Start with decreasing the sugar in coffee.   1 tsp less sugar    Try sub dr pepper zero sugar for regular   Goal protein: 100 grams/day             PRODUCE  [] All fresh fruit   [] All fresh vegetables   [] All fresh herbs  [] All herb purees + pastes  [] Pre-spiralized vegetable noodles   [] Steam-In-The-Bag begetables  [] Riced cauliflower  [] Jicama sticks  [] Love Beets  all varieties  [] Wholly Guacamole  all varieties  [] Hummus  all varieties, chickpea + vegetable  [] Tofu Shirataki noodles    [] Tofu  all varieties  [] Tempeh  all varieties    PROTEIN  CHICKEN   [] Boneless, skinless breasts  [] Boneless, skinless thighs  [] Ground chicken breast, at least 93% lean  [] Chicken breast cutlet  [] Aidell's  Chicken Sausage + Chicken Meatballs    TURKEY   [] Turkey breast tenderloin   [] Ground turkey breast, at least 93% lean  [] [place] Naturals  Turkey Sausage    BEEF  [] Tenderloin  [] Sirloin  [] Top Loin  [] Flank Steak  [] Round Steak  [] Filet  [] Lean ground beef, at least 93% lean + grass-fed preferable    PORK  [] Tenderloin  [] Pork Chop  [] Center Cut  [] [place] Naturals  No-Sugar Goff    BISON  [] Moroni  90 - 95% lean    SEAFOOD  [] All fresh fish + seafood; locally sourced when possible  [] Smoked salmon    HEAT + EAT ENTREES   [] Harrison's Natural Foods  Chicken, Pork, Beef  [] Jason  "All Natural" Grilled Chicken Breast + Strips, all varieties    SAUCES SPREADS + DIPS  [] Bitchin Sauce  Original, Chipotle, Cilantro Morehouse  [] Heidi's Kitchen  Tzatziki Yogurt Dip, Babaganoush, Hummus  [] Wholly Guacamole  all varieties  [] Hummus  all varieties  [] [place] Gringo Salsa  all varieties  [] Mrs. Umair's Salsa  all varieties  [] Stubb's All Natural BBQ Sauce  [] Primal Kitchen  Kim, Ketchup, BBQ Sauce  [] Primal Kitchen Pasta Sauce  Roasted Garlic, Tomato Basil, no-dairy Vodka Sauce  [] Sal & Arminda's  HeartSmart Pasta " Sauce    DAIRY/DAIRY SUBSTITUTES/EGGS  EGGS   [] All eggs  cage-free, pasture-raise preferable  [] Crepini  egg wraps  [] Vital Farms  Pasture-Raised Egg Bites  [] JUST Egg [vegan]     CREAMERS   [] Califia  Better Half, original + vanilla unsweetened  [] NutPods  all varieties    MILK   [] Horizon Organic  all varieties except chocolate  [] Organic Valley  all varieties except chocolate  [] Organic Valley  ultra-filtered, reduced fat milk     PLANT_BASED MILK ALTERNATIVES  [] All unsweetened almond milks  original, vanilla + chocolate  [] Ripple  unsweetened   [] Milkadamia  original +_ vanilla, unsweetened   [] Forager  original + vanilla, unsweetened   [] Silk Organic  soy milk, unsweetened  [] Oatly  unsweetened  [] Califia  regular + protein-fortified oat milk, unsweetened     CHEESES  [] Regular or reduced fat cheeses  [] BelGioso  Fresh Mozzarella Snack Packs, Parmesan Power-full Snack   [] Goat cheese  [] Fresh mozzarella  [] String cheese  all varieties  [] Deyanira Cottage Cheese  [] Genesis's Cultured Cottage Cheese  [] Myra Life 'Just Like Mozzarella'  plant-based shreds and other varieties  [] Parmela Creamery  plant-based shredded cheese    YOGURT  [] Fage  2% low-fat, plain  [] Siggi's  plain, vanilla  [] Chobani Greek  nonfat + whole milk yogurt, plain   [] Chobani Less Sugar  all flavored varieties   [] Oikos Greek  nonfat, plain  [] Two Good  all varieties   [] Mexican Provisions  plain  [] [place] Organic  low-fat + nonfat, plain  [] [facility]Grand [facility]  goat milk yogurt, plain  [] Kefit  unsweetened, plain  [] Forager  Greek style unsweetened, plain [dairy-free]  [] Zachary Hill  unsweetened Greek style, plain [dairy-free]  [] [facility]  almond milk yogurt, vanilla or plain, unsweetened [dairy-free]    FREEZER SECTION  FROZEN VEGGIES  [] All plain frozen veggies + greens [e.g. broccoli, brussels, carrots, okra, mushrooms, zucchini, yellow squash, butternut squash, kale,  spinach, sandra greens]  [] Riced veggies [e.g. cauliflower, broccoli, butternut squash]  [] Edamame  all varieties  [] Green Giant  [] Veggie Spirals  [] Marinated Veggies [e.g. eggplant, peppers, zucchini]  [] Simply Steam [place] Sprouts  [] Birds Eye  [] Power Blend Italian Style  lentils, broccoli, kale, zucchini  []  [place] Sprouts & Carrots  [] Oven Roasters Broccoli & Cauliflower  [] [place] Blend  [] Tattooed   [] Green Bean Blend  [] Farmer's Market Ratatouille  [] Butter Balsamic Glazed Vegetables  [] Riced Cauliflower & Quinoa Mediterranean Style  [] Cristiane's Good Life  Southern Style Greens [sauteed kale + onion]    FROZEN FRUITS  [] All unsweetened frozen fruits  all varieties  [] Dole Fruits & Veggie Blends  Berries 'n Kale  [] Dole Mix-ins  Triple Berry     FROZEN ENTREES  [] The Good Kitchen meals  all varieties [ e.g. Chili Lime Chicken Over Riced Cauliflower]  [] Premium Paleo  Not Emil Momma's Meatloaf  [] Primal Kitchen  Chicken Pesto + Steak Fajitas w/ Peppers & Onions  [] Eating Well Frozen Entrees  Butter Chicken Masala, Steak Carne Asada, Creamy Pesto Chicken, Chicken + Wild Rice Stroganoff, Yellow Riggs Chicken, Sun-dried Tomato Chicken, Chicken Lo Mein  [] Realgood Entree Bowls  [place] Inspired Beef Bowl over Riced Cauliflower, Chicken Burrito Bowl   [] Great Karma Coconut Riggs  [] Keya's  [place]ale Nona with Black Beans, Vegetable Lasagna  [] Kashi Mayan Barnet Bake  [] Healthy Choice  Simply Steamers Chicken Fried Rice  [] Basil Pesto Chicken & Israeli Style Pork Power Bowls  [] Tattooed   Enchilada Bowl  [] Loco Farms  Spicy Black Bean Burgers    FROZEN PIZZAS  [] Cauli'flour Foods  Cauliflower Pizza Crusts  [] Outer Aisle  Cauliflower Crust  [] Keya's  Veggie Crust Cheese Pizza  [] Quest Pizza     VEGETARIAN PRODUCTS  [] Beyond Meat  ground 'meat' + grilled 'chicken' strips  [] Tofurkey  Original Italian Sausage + Original Tempeh  []  Gardein  Beefless Ground + Meatless Meatballs  [] apiOmat Grillers  Original Burger, Crumbles, Meatballs    ICE CREAMS + FROZEN DESSERTS  [] Halo Top  regular + keto series, pops  [] Rebel  ice cream + dessert sandwiches  [] Enlightened  ice cream + bars  [] Nightfood  ice cream  [] Realgood  ice cream  [] Arctic Zero Fit  frozen pint  [] The Frozen Farmer  sorbets  [] Wholly Rollies  Protein Balls, all varieties  [] Dream Pops  Coconut Latte    FROZEN BREAKFASTS  [] Realgood  Breakfast Sandwiches on Cauliflower Cheesy Bread  [] Rebel  ice cream + dessert sandwiches  [] Enlightened  ice cream + bars  [] Nightfood  ice cream  [] Realgood  ice cream  [] Arctic Zero Fit  frozen pint  [] The Frozen Farmer  sorbets  [] Wholly Rollies  Protein Balls, all varieties  [] Dream Pops  Coconut Latte    BREADS/BUNS/WRAPS  [] Shiv Bread: All Types - In Freezer Section   [] Flat Out Light Wraps - All Varieties   [] Flat Out Protein Up Carb Down Flat Bread   [] Kontos Whole Wheat Pocket Kenyatta   [] Carrington CARROLL 100% Whole Wheat Tortillas   [] LaTortilla Factory Tortillas - Smart & Delicious; 50 or 80-calorie   [] Nature's Own 100% Whole Wheat Bread   [] Orowheat Healthful - 100% Whole Wheat Slice Bread and Tower Thins   [] Orowheat Healthful - Whole Wheat Nuts & Grain Bread; Flax & Seed Bread   [] Pepperidge Farm Natural Whole Grain 15 Bread   [] Pepperidge Farm Natural Whole Grain English Muffin - 100% Whole Wheat   [] Pepperidge Farm Very Thin 100% Whole Wheat   [] Shawna Robertson 45 Calories and Delightful   [] Ceasar' 100% Whole Wheat Thin-Sliced Bagels and English Muffins   [] Western Bagel: Perfect 10     GLUTEN FREE  [] Ryan's Gluten Free Bread   [] Charlton Bakehouse 7-Grain Gluten Free Bread     LEGUME PASTA   [] Explore Asian Organic Black Bean Spaghetti   [] Modern Table   [] Tolerant Foods       NUT BUTTERS & JELLIES    NUT BUTTERS   [] Better'n Chocolate: Coconut Chocolate Peanut Butter Spread   []  Better'n Peanut Butter - All Types   [] Earth Balance Coconut and Peanut Spread   [] Joao's Nut [place]   [] MaraNatha: All Natural Roasted Cashew Butter - Beaver or Creamy   [] MaraNatha: Roasted Peanut Butter   [] Nuts 'N More Peanut [place] - All Flavors   [] PB2 Powder - Original or Chocolate   [] Skippy Natural - Creamy, Super Chunk   [] Smart Balance Peanut Butter - Beaver or Creamy   [] Peanut Butter & Company:   [] Smooth , Crunch Time, The Heat Is On, Old Fashioned Smooth, Mighty Nut- Powdered Peanut Butter, Squeeze Pack   [] Smucker's Natural Peanut Butter - Beaver or Creamy   [] Sunbutter Nut Butter   [] Wild Friends Protein Peanut Butter/[place] o Butter - Vanilla or Chocolate     JELLIES  o Polaner's All Fruit   o Clearly Organic Best Choice: Strawberry Fruit Spread       SNACKS    BARS  [] Kashi Bars - Chewy or Crunchy; Honey [place] o Flax or Peanut Butter   [] KIND Bars - 5 Grams of Sugar or Less   [] KIND Protein Bars - Strong and KIND   [] Nature Valley Protein Bar - All Varieties   [] Nature Valley Roasted Nut Crunch - [place] Crunch; Peanut Crunch   [] [facility] Simple Nut Bar - Roasted Peanut & Honey   [] [facility] Simple Nut Bar - [place], Cashew & Sea Salt   [] [facility] Nut [place] Bar - Salted Caramel Peanut   [] Think Thin Protein Bars   [] Quest Bars, Power Crunch Bars, Pure Protein Bars     BEEF JERKY - NITRATE FREE   [] Game On   [] Grass Run Farms   [] Krave   [] Ostrim   [] Perky Jerky   [] Primal Strips Meatless Vegan Jerky   [] [place]     CHIPS   [] Beanitos Chips   [] Fruit Crisps - e.g. Brother's-All-Natural, Bare Fruit, Yoga Chips   [] Telma's Soy Crisps: 1.3 ounce bag   [] Quest Protein Chips   [] Wasa Whole Wheat Crisp Bread     CRACKERS  [] Marni's Gone Crackers   [] Nabisco Triscuit: Regular and Thin Crisp Crackers   [] Vans Say Cheese Crackers (G-F)     POPCORN/NUTS   [] Rodrigo Bowser's Smart Pop Popcorn - Single Serving   [] 100-Calorie Pack of Nuts -  All Varieties     PROTEIN POWDERS & DRINKS  []  Protein -  Whey Protein Powder   [] Garden of Life Raw Protein Powder   [] Iconic Ready-To-Drink Protein Drink   [] Valverde One Protein Powder   [] VegaSport Protein Powder     SALSA/HUMMUS/DIPS   [] Eat Well Embrace Life: Wilnicole Demarco Carrot o Hummus   [] Pre-Portioned Guacamole Packs   [] Priyanka's   [] Tostitos Restaurant Style Salsa       SOUPS   [] Keya's Organic Soups - Lentil, Vegetable, Split Pea, Low-Sodium     CANNED GOODS   [] 100% Pure Pumpkin   [] BlueRunner Creole Cream-Style Red Beans or Navy Beans   [] Cajun Power Chicken Gumbo Base   [] Chicken of the Sea [place]   [] Myah Fresh Cut Sliced Beets   [] Hormel Breast of Chicken in Water   [] LeSuer Tender Baby Whole Carrots   [] Erlindalconrado Tabasco [place] Starter   [] Celiaist: Chunk Lite Tuna in Water, Gourmet Select Pouches   [] StarKist: Yellowfin Tuna Fillets   [] Trappey's: Kidney, Butter, Jaramillo, Black Eye, Field, and Black Beans   [] ASHLEY Bautista's Turnip Greens or Dylon Spinach     CONDIMENTS/ SAUCES/SPREADS/ SPICES  [] Manny Vera's Magic Seasonings - Regular or Salt Free   [] Jose E De Dios's Sauces - All Flavors   [] Laughing Cow Light - All Flavors   [] Dash Salt-Free Marinade - All Flavors   [] Gareth & Arminda's: Heart Smart Pasta Sauces   [] Tabasco     SALAD DRESSINGS  [] Tami's Naturals: Lite Honey Mustard   [] Valerio's Own: Lighten Up Salad Dressing - All Varieties   [] OPA Greek Yogurt Dressings - Ranch, Blue o Cheese, Caesar, Feta Dill     SWEETENERS  [] Sweet McGuffey Sweetener   [] Swerve   [] Truvia     BEVERAGES  [] Coconut Water   [] Crystal Light PURE - All Flavors   [] Honest Tea: Just Green Tea, Unsweetened   [] Kombucha Tea   [] [place]   [] [place] Sisters Bloody Marni Mix   [] Metromint - Zero-Sugar; All Natural Flavored   [] Priscilla - Plain or Flavored   [] Michael Sánchez   [] Steaz - Zero-Sugar, All-Natural, Sparkling Tea   [] Tea Bags: Any Brand - e.g.  Per, Yogi, Tazzo, Celestial   [] V8 100% Vegetable Juice   [] Vitamin Water Zero   [] Water   [] Zevia - Stevia Sweetened Soft Drink     BEER/CHAPARRO/LIQUORS  []Gomez's Premier Light 64 Calories   [] Bud Select - 55 Calories   [] [facility] Sisters Bloody Marni Mix   [] Price Genuine Draft - 64 Calories   [] Red or White Wine - All Varieties     CEREALS: HOT/COLD   [] Maine'edgar Lares's Original Cereal  [] Koffi's Mill Oat Bran Hot Cereal - Cracked Wheat, Multi-Grain  [] Kashi GoLean Cereal  [] Kashi GoLean Hot Cereal packets - Vanilla; Honey Cinnamon  [] Annmarie's Special K Protein Cereal  [] Christopher's Steel Cut Carmen Oatmeal  [] Nature's Path Smart Bran  [] Cheondoism Instant Oatmeal packet, Original  [] Cheondoism Old Fashioned Cheondoism Oats  [] Uncle Julio's Whole Wheat & Flaxseed Original Cereal

## 2023-11-17 ENCOUNTER — PATIENT MESSAGE (OUTPATIENT)
Dept: PRIMARY CARE CLINIC | Facility: CLINIC | Age: 43
End: 2023-11-17
Payer: COMMERCIAL

## 2024-02-14 DIAGNOSIS — F51.04 PSYCHOPHYSIOLOGIC INSOMNIA: ICD-10-CM

## 2024-02-14 DIAGNOSIS — F41.9 RECURRENT MILD MAJOR DEPRESSIVE DISORDER WITH ANXIETY: Chronic | ICD-10-CM

## 2024-02-14 DIAGNOSIS — K21.9 GASTROESOPHAGEAL REFLUX DISEASE WITHOUT ESOPHAGITIS: ICD-10-CM

## 2024-02-14 DIAGNOSIS — F50.81 BINGE EATING DISORDER: ICD-10-CM

## 2024-02-14 DIAGNOSIS — F33.0 RECURRENT MILD MAJOR DEPRESSIVE DISORDER WITH ANXIETY: Chronic | ICD-10-CM

## 2024-02-14 NOTE — TELEPHONE ENCOUNTER
No care due was identified.  Health [facility] Embedded Care Due Messages. Reference number: [id number].   2/14/2024 3:40:08 PM CST

## 2024-02-29 ENCOUNTER — PATIENT MESSAGE (OUTPATIENT)
Dept: PRIMARY CARE CLINIC | Facility: CLINIC | Age: 44
End: 2024-02-29
Payer: COMMERCIAL

## 2024-02-29 RX ORDER — ESCITALOPRAM OXALATE 10 MG/1
10 TABLET ORAL DAILY
Qty: 90 TABLET | Refills: 3 | OUTPATIENT
Start: 2024-02-29

## 2024-02-29 RX ORDER — LISDEXAMFETAMINE DIMESYLATE 30 MG/1
30 CAPSULE ORAL EVERY MORNING
Qty: 30 CAPSULE | Refills: 0 | OUTPATIENT
Start: 2024-02-29

## 2024-02-29 RX ORDER — CLONAZEPAM 0.5 MG/1
0.5 TABLET ORAL DAILY PRN
Qty: 30 TABLET | Refills: 5 | OUTPATIENT
Start: 2024-02-29 | End: 2025-02-28

## 2024-02-29 RX ORDER — PANTOPRAZOLE SODIUM 40 MG/1
40 TABLET, DELAYED RELEASE ORAL DAILY
Qty: 90 TABLET | Refills: 3 | OUTPATIENT
Start: 2024-02-29 | End: 2025-02-28

## 2024-02-29 NOTE — TELEPHONE ENCOUNTER
Refill not approved.  Appointment (virtual or in-office) required for refills.    TO MY TEAM: Notify patient and schedule appointment.  Thanks!

## 2024-06-07 ENCOUNTER — PATIENT MESSAGE (OUTPATIENT)
Dept: INTERNAL MEDICINE | Facility: CLINIC | Age: 44
End: 2024-06-07

## 2024-06-07 ENCOUNTER — OFFICE VISIT (OUTPATIENT)
Dept: INTERNAL MEDICINE | Facility: CLINIC | Age: 44
End: 2024-06-07
Payer: COMMERCIAL

## 2024-06-07 VITALS
RESPIRATION RATE: 18 BRPM | DIASTOLIC BLOOD PRESSURE: 86 MMHG | HEIGHT: 62 IN | HEART RATE: 95 BPM | OXYGEN SATURATION: 96 % | WEIGHT: 246.5 LBS | TEMPERATURE: 98 F | BODY MASS INDEX: 45.36 KG/M2 | SYSTOLIC BLOOD PRESSURE: 118 MMHG

## 2024-06-07 DIAGNOSIS — K21.9 GASTROESOPHAGEAL REFLUX DISEASE WITHOUT ESOPHAGITIS: Chronic | ICD-10-CM

## 2024-06-07 DIAGNOSIS — E66.01 CLASS 3 SEVERE OBESITY DUE TO EXCESS CALORIES WITH SERIOUS COMORBIDITY AND BODY MASS INDEX (BMI) OF 45.0 TO 49.9 IN ADULT: Chronic | ICD-10-CM

## 2024-06-07 DIAGNOSIS — Z13.1 SCREENING FOR DIABETES MELLITUS: ICD-10-CM

## 2024-06-07 DIAGNOSIS — Z12.12 SCREENING FOR COLORECTAL CANCER: ICD-10-CM

## 2024-06-07 DIAGNOSIS — Z12.11 SCREENING FOR COLORECTAL CANCER: ICD-10-CM

## 2024-06-07 DIAGNOSIS — F33.0 RECURRENT MILD MAJOR DEPRESSIVE DISORDER WITH ANXIETY: Chronic | ICD-10-CM

## 2024-06-07 DIAGNOSIS — Z00.00 PREVENTATIVE HEALTH CARE: Primary | ICD-10-CM

## 2024-06-07 DIAGNOSIS — Z12.31 BREAST CANCER SCREENING BY MAMMOGRAM: ICD-10-CM

## 2024-06-07 DIAGNOSIS — F50.81 BINGE EATING DISORDER: Chronic | ICD-10-CM

## 2024-06-07 DIAGNOSIS — F41.9 RECURRENT MILD MAJOR DEPRESSIVE DISORDER WITH ANXIETY: Chronic | ICD-10-CM

## 2024-06-07 LAB
ALBUMIN SERPL BCP-MCNC: 3.8 G/DL (ref 3.5–5.2)
ALP SERPL-CCNC: 74 U/L (ref 55–135)
ALT SERPL W/O P-5'-P-CCNC: 16 U/L (ref 10–44)
ANION GAP SERPL CALC-SCNC: 10 MMOL/L (ref 8–16)
AST SERPL-CCNC: 17 U/L (ref 10–40)
BILIRUB SERPL-MCNC: 0.4 MG/DL (ref 0.1–1)
BUN SERPL-MCNC: 10 MG/DL (ref 6–20)
CALCIUM SERPL-MCNC: 9.6 MG/DL (ref 8.7–10.5)
CHLORIDE SERPL-SCNC: 106 MMOL/L (ref 95–110)
CO2 SERPL-SCNC: 23 MMOL/L (ref 23–29)
CREAT SERPL-MCNC: 0.8 MG/DL (ref 0.5–1.4)
EST. GFR  (NO RACE VARIABLE): >60 ML/MIN/1.73 M^2
ESTIMATED AVG GLUCOSE: 105 MG/DL (ref 68–131)
GLUCOSE SERPL-MCNC: 86 MG/DL (ref 70–110)
HBA1C MFR BLD: 5.3 % (ref 4–5.6)
POTASSIUM SERPL-SCNC: 4.5 MMOL/L (ref 3.5–5.1)
PROT SERPL-MCNC: 7.4 G/DL (ref 6–8.4)
SODIUM SERPL-SCNC: 139 MMOL/L (ref 136–145)

## 2024-06-07 PROCEDURE — G2211 COMPLEX E/M VISIT ADD ON: HCPCS | Mod: S$GLB,,, | Performed by: FAMILY MEDICINE

## 2024-06-07 PROCEDURE — 80053 COMPREHEN METABOLIC PANEL: CPT | Performed by: FAMILY MEDICINE

## 2024-06-07 PROCEDURE — 1159F MED LIST DOCD IN RCRD: CPT | Mod: CPTII,S$GLB,, | Performed by: FAMILY MEDICINE

## 2024-06-07 PROCEDURE — 99214 OFFICE O/P EST MOD 30 MIN: CPT | Mod: S$GLB,,, | Performed by: FAMILY MEDICINE

## 2024-06-07 PROCEDURE — 1160F RVW MEDS BY RX/DR IN RCRD: CPT | Mod: CPTII,S$GLB,, | Performed by: FAMILY MEDICINE

## 2024-06-07 PROCEDURE — 3044F HG A1C LEVEL LT 7.0%: CPT | Mod: CPTII,S$GLB,, | Performed by: FAMILY MEDICINE

## 2024-06-07 PROCEDURE — 99999 PR PBB SHADOW E&M-EST. PATIENT-LVL IV: CPT | Mod: PBBFAC,,, | Performed by: FAMILY MEDICINE

## 2024-06-07 PROCEDURE — 3008F BODY MASS INDEX DOCD: CPT | Mod: CPTII,S$GLB,, | Performed by: FAMILY MEDICINE

## 2024-06-07 PROCEDURE — 83036 HEMOGLOBIN GLYCOSYLATED A1C: CPT | Performed by: FAMILY MEDICINE

## 2024-06-07 PROCEDURE — 3079F DIAST BP 80-89 MM HG: CPT | Mod: CPTII,S$GLB,, | Performed by: FAMILY MEDICINE

## 2024-06-07 PROCEDURE — 3074F SYST BP LT 130 MM HG: CPT | Mod: CPTII,S$GLB,, | Performed by: FAMILY MEDICINE

## 2024-06-07 RX ORDER — PANTOPRAZOLE SODIUM 40 MG/1
40 TABLET, DELAYED RELEASE ORAL DAILY
Qty: 90 TABLET | Refills: 3 | Status: SHIPPED | OUTPATIENT
Start: 2024-06-07 | End: 2025-06-07

## 2024-06-07 RX ORDER — ESCITALOPRAM OXALATE 10 MG/1
10 TABLET ORAL DAILY
Qty: 30 TABLET | Refills: 2 | Status: SHIPPED | OUTPATIENT
Start: 2024-06-07

## 2024-06-07 RX ORDER — LISDEXAMFETAMINE DIMESYLATE 30 MG/1
30 CAPSULE ORAL EVERY MORNING
Qty: 30 EACH | Refills: 0 | Status: SHIPPED | OUTPATIENT
Start: 2024-06-07

## 2024-06-07 NOTE — PROGRESS NOTES
ANNUAL WELLNESS VISIT (PREVENTIVE SERVICES)  6/7/24 10:20 AM CDT    CHIEF COMPLAINT: Follow-up    HEALTH MAINTENANCE INTERVENTIONS - UP TO DATE  Health Maintenance Topics with due status: Not Due       Topic Last Completion Date    Cervical Cancer Screening 07/25/2022    Lipid Panel 11/30/2022    Influenza Vaccine 12/03/2022    Hemoglobin A1c (Diabetic Prevention Screening) 06/07/2024     HEALTH MAINTENANCE INTERVENTIONS - DUE OR DUE SOON  Health Maintenance Due   Topic Date Due    TETANUS VACCINE  Never done    Mammogram  07/25/2023    COVID-19 Vaccine (4 - 2023-24 season) 09/01/2023    Colorectal Cancer Screening  Never done      Shelby is here for annual wellness and preventive services visit.  Health maintenance care gaps reviewed; relevant closure actions recommended.  CDC-recommended immunizations advocated.  USPSTF-recommended screenings advocated.       Shelby is also here for evaluation and management of unrelated problems reported separately.      1. Preventative health care  -     Mammo Digital Screening Bilat; Future; Expected date: 06/07/2024  -     Cologuard Screening (Multitarget Stool DNA); Future; Expected date: 06/07/2024  -     Hemoglobin A1C; Future; Expected date: 06/07/2024  -     Comprehensive Metabolic Panel; Future; Expected date: 06/07/2024    2. Breast cancer screening by mammogram  -     Mammo Digital Screening Bilat; Future; Expected date: 06/07/2024    3. Screening for colorectal cancer  -     Cologuard Screening (Multitarget Stool DNA); Future; Expected date: 06/07/2024    4. Screening for diabetes mellitus  -     Hemoglobin A1C; Future; Expected date: 06/07/2024  -     Comprehensive Metabolic Panel; Future; Expected date: 06/07/2024    Review of Systems   Respiratory:  Negative for chest tightness and shortness of breath.    Cardiovascular:  Negative for chest pain.   Endocrine: Negative for polydipsia and polyuria.       Vitals:    06/07/24 1047   BP: 118/86   BP Location: Right arm  "  Patient Position: Sitting   BP Method: Thigh Cuff (Manual)   Pulse: 95   Resp: 18   Temp: 98.2 °F (36.8 °C)   SpO2: 96%   Weight: 111.8 kg (246 lb 7.6 oz)   Height: 5' 2" (1.575 m)   Physical Exam  Vitals reviewed.   Constitutional:       General: She is not in acute distress.     Appearance: Normal appearance.   Eyes:      General: No scleral icterus.     Conjunctiva/sclera: Conjunctivae normal.   Neck:      Vascular: No carotid bruit.   Cardiovascular:      Rate and Rhythm: Normal rate and regular rhythm.      Heart sounds: Normal heart sounds.   Pulmonary:      Effort: Pulmonary effort is normal. No respiratory distress.      Breath sounds: Normal breath sounds. No wheezing or rhonchi.   Abdominal:      General: Bowel sounds are normal. There is no distension.      Palpations: Abdomen is soft. There is no mass.      Tenderness: There is no abdominal tenderness.   Lymphadenopathy:      Cervical: No cervical adenopathy.   Skin:     General: Skin is warm and dry.      Coloration: Skin is not jaundiced.   Neurological:      General: No focal deficit present.      Mental Status: She is alert and oriented to person, place, and time.   Psychiatric:         Mood and Affect: Mood normal.         Behavior: Behavior normal.         Judgment: Judgment normal.       ADDITIONAL E&M SERVICES PROVIDED - UNRELATED TO PREVENTIVE SERVICES  6/7/24 10:20 AM CDT    In addition to and unrelated to the preventive services provided this date, the following condition(s) were also evaluated and managed.    History of Present Illness      She reports a longstanding history of headaches that have become more persistent and predominant over the past couple of months. She takes ibuprofen daily for management.    She ran out of refills for Vyvanse which was helping with binge eating disorder but did not find it effective for insomnia. She is also out of escitalopram and could not get refills as pharmacy needed prescription approval. She is " taking pantoprazole which is helping with acid reflux. She takes OTC vitamin D and B12 supplements.    She is holding steady weight, not losing but also not gaining.    44-year-old female due for routine health maintenance screening. Her last mammogram was 11 months ago and is requesting to schedule her next one. She denies any personal history of breast implants. She is also due to initiate colon cancer screening given her age. She denies any personal or close family history of colon cancer but reports her mother has a history of polyps and diverticulitis. After discussing the options, she prefers to undergo a colonoscopy as the more thorough screening test rather than stool-based testing. She is amenable to receiving the colon cancer screening kit by mail and completing it within a week of receipt if that is the preferred option.    She reports inability to afford Wegovy medication out-of-pocket as insurance would not cover it. Her  is still working full-time at age 64 and has not completed Medicare paperwork yet, as she and children are currently covered under 's work insurance plan. She is concerned about losing current insurance coverage if  were to retire and enroll in Medicare.       Assessment & Plan    R51.0 HEADACHE WITH ORTHOSTATIC COMPONENT, NOT ELSEWHERE CLASSIFIED:   The patient's headaches have become more predominant and persistent over the last couple of months, with daily use of ibuprofen.   Suspected medication overuse headaches due to the patient's daily headaches and overuse of ibuprofen.   Educated the patient on medication overuse headaches and advised to stop ibuprofen.   Scheduled a follow up in 3-4 weeks with a virtual visit to further discuss headaches and review medication adjustments.  Z13.1 ENCOUNTER FOR SCREENING FOR DIABETES MELLITUS:   Ordered A1c and CMP tests for the patient.   The patient is advised to follow up with labs today.  F50.89 OTHER SPECIFIED EATING  DISORDER:   The patient had some video visits with  Riki in the past for binge eating disorder but fell off track with it.   Restarted Vyvanse at the previous dose as the patient felt it was helpful for binge eating disorder, though may need dose adjustment.   Scheduled a follow up in 1 month with an e-visit for Vyvanse dose adjustment if needed.   Provided instructions on how to complete an e-visit.   Once at an effective dose, a 90-day supply can be provided.  F41.9 ANXIETY DISORDER, UNSPECIFIED:   The patient ran out of escitalopram refills.   Will restart escitalopram 10 mg for depression/anxiety.   The patient can discuss increasing the escitalopram dose if needed.  K21.9 GASTRO-ESOPHAGEAL REFLUX DISEASE WITHOUT ESOPHAGITIS:   The patient is taking pantoprazole for acid reflux, which is helping.   Advised to continue pantoprazole.  F32.9 MAJOR DEPRESSIVE DISORDER, SINGLE EPISODE, UNSPECIFIED:   The patient ran out of escitalopram refills.   Will restart escitalopram 10 mg for depression/anxiety.   The patient can discuss increasing the escitalopram dose if needed.  Z12.12 ENCOUNTER FOR SCREENING FOR MALIGNANT NEOPLASM OF RECTUM:   The patient has no family history of colon cancer and is at normal risk.   Will do Cologuard colon cancer screening.   Will send a Cologuard kit to the patient to complete and return in 1 week.  Z12.31 ENCOUNTER FOR SCREENING MAMMOGRAM FOR MALIGNANT NEOPLASM OF BREAST:   The patient is overdue for a screening mammogram.   Ordered a mammogram and the staff will help schedule it at [facility].  LIFESTYLE CHANGES:   Continued OTC vitamin D supplement.   Continued OTC vitamin B12.   Staff will schedule appointment.       5. Class 3 severe obesity due to excess calories with serious comorbidity and body mass index (BMI) of 45.0 to 49.9 in adult  Assessment & Plan:  Wt Readings from Last 6 Encounters:   06/07/24 111.8 kg (246 lb 7.6 oz)   11/13/23 108.4 kg (239 lb)  "  08/31/23 109.8 kg (242 lb)   07/14/23 109.7 kg (241 lb 13.5 oz)   07/14/23 109.8 kg (242 lb)   03/22/23 110.8 kg (244 lb 4.3 oz)     Estimated body mass index is 45.08 kg/m² as calculated from the following:    Height as of this encounter: 5' 2" (1.575 m).    Weight as of this encounter: 111.8 kg (246 lb 7.6 oz).    Therapeutic lifestyle changes encouraged.     Orders:  -     Hemoglobin A1C; Future; Expected date: 06/07/2024  -     Comprehensive Metabolic Panel; Future; Expected date: 06/07/2024    6. Recurrent mild major depressive disorder with anxiety  -     EScitalopram oxalate (LEXAPRO) 10 MG tablet; Take 1 tablet (10 mg total) by mouth once daily.  Dispense: 30 tablet; Refill: 2  -     lisdexamfetamine (VYVANSE) 30 MG capsule; Take 1 capsule (30 mg total) by mouth every morning.  Dispense: 30 each; Refill: 0    7. Binge eating disorder  -     lisdexamfetamine (VYVANSE) 30 MG capsule; Take 1 capsule (30 mg total) by mouth every morning.  Dispense: 30 each; Refill: 0    8. Gastroesophageal reflux disease without esophagitis  -     pantoprazole (PROTONIX) 40 MG tablet; Take 1 tablet (40 mg total) by mouth once daily.  Dispense: 90 tablet; Refill: 3    No other significant complaints or concerns were reported.  Vitals:    06/07/24 1047   BP: 118/86   BP Location: Right arm   Patient Position: Sitting   BP Method: Thigh Cuff (Manual)   Pulse: 95   Resp: 18   Temp: 98.2 °F (36.8 °C)   SpO2: 96%   Weight: 111.8 kg (246 lb 7.6 oz)   Height: 5' 2" (1.575 m)     Physical Exam    Constitutional: No acute distress. Normal appearance. Not ill-appearing.  Pulmonary: Pulmonary effort is normal. No respiratory distress.  Skin: Skin is not jaundiced.  Neurological: Alert. Mental status is at baseline.  Psychiatric: Mood normal. Behavior normal. Thought content normal.       This note was generated with the assistance of ambient listening technology. Verbal consent was obtained by the patient and accompanying visitor(s) for " "the recording of patient appointment to facilitate this note. I attest to having reviewed and edited the generated note for accuracy, though some syntax or spelling errors may persist. Please contact the author of this note for any clarification.    Documentation entered by me for this encounter may have been done in part using speech-recognition technology. Although I have made an effort to ensure accuracy, "sound like" errors may exist and should be interpreted in context.    "

## 2024-06-07 NOTE — PATIENT INSTRUCTIONS
Medication Overuse Headaches: A Patient Education Guide    Overview    Medication overuse headaches, sometimes referred to as rebound headaches, are headaches that occur due to prolonged use of headache medicines, including those used for migraines. These are different from your typical occasional headaches, and can occur if you use such medication more than a couple of days per week. This is more common in people with a history of headache disorders, such as migraines, but doesn't seem to be a problem for those without any previous history of headaches.    These headaches generally improve once you stop taking the medication, but that process can be difficult short term. However, your health care provider can assist you in finding effective strategies for long-term relief.    Symptoms    Symptoms of medication overuse headaches may differ based on the type of headache being treated and the medication used. Generally, these headaches:    - Happen daily or nearly every day, often disturbing your sleep in the early morning.  - Improve with pain medication, but return when the effects of the medicine wear off.    Additional symptoms might include nausea, restlessness, difficulty concentrating, memory issues, and irritability.    When to Consult a Doctor    While occasional headaches are common, severe or persistent headaches could be indicative of serious health issues. Seek immediate medical attention if your headache is sudden and severe, or accompanied by other symptoms such as fever, stiff neck, rash, confusion, seizure, double vision, weakness, numbness, difficulty speaking, or shortness of breath. Likewise, consult your healthcare provider if you find yourself needing pain relievers for headaches more than twice a week, if your headache pattern changes, or if over-the-counter medication doesn't bring you relief.    Causes and Risk Factors    The exact reason why medication overuse headaches occur is not fully  understood. However, most headache medications, including common pain relievers like aspirin, acetaminophen, ibuprofen, and combination pain relievers, as well as certain migraine medicines and opioids, can potentially contribute to these headaches. Consuming excessive caffeine, found in coffee, soda, pain relievers, and other products, can also contribute to this condition.    Certain factors increase your risk of developing medication overuse headaches. These include a history of lifelong headaches, especially migraines, regular use of headache medicines, and a history of substance use disorders.    Prevention    Preventing medication overuse headaches involves careful management of your medication intake. This includes adhering to your prescribed medication regimen, limiting the use of over-the-counter painkillers, and using combination pain relievers and migraine medication sparingly. Other prevention strategies include maintaining a consistent sleep schedule, regular meal times, hydration, and regular exercise, reducing stress, losing weight if necessary, and quitting smoking.    Diagnosis and Treatment    Your health care provider can usually diagnose medication overuse headaches based on your medical history and regular use of medication, without needing to resort to tests.    Treatment primarily involves limiting or completely stopping the use of pain medication. This process can initially exacerbate headaches and cause withdrawal symptoms like nervousness, restlessness, nausea, vomiting, insomnia, and constipation. However, these symptoms generally last from 2 to 10 days and rarely continue for several weeks.    To assist with this process, your health care provider might recommend bridge therapy, which includes treatments to help manage the pain and withdrawal symptoms. In severe cases, short hospital stays may be recommended.    Once the cycle of medication overuse headaches is broken, your healthcare  provider may prescribe preventive medicines, such as anticonvulsants, antidepressants, beta blockers, or calcium channel blockers, to prevent recurrence.    Treatment Options    Once medication overuse headaches have been diagnosed, the key to treatment is reducing or stopping the overused medications. This can lead to short-term increases in headache symptoms, however, it's a crucial step to break the cycle.     Your healthcare provider can help you create a plan that gradually reduces your medication dosage, or they may recommend immediately stopping the medication altogether. This process can come with withdrawal symptoms like restlessness, nausea, insomnia, and constipation, typically lasting from 2 to 10 days, though in some cases they can persist for several weeks.    In some instances, your healthcare provider may prescribe treatments to ease the headache pain and withdrawal symptoms, known as bridge or transitional therapy. These treatments may include nonsteroidal anti-inflammatory drugs, corticosteroids, or nerve blocks. In severe cases, hospitalization may be recommended, especially if you're using high doses of certain types of drugs or have other conditions such as anxiety or depression.    Preventive medications may be introduced to help you manage your headaches in a safer way and avoid relapsing into medication overuse. These can include anticonvulsants, tricyclic antidepressants, beta blockers, or calcium channel blockers. If you have a history of migraines, your provider might recommend injections of CGRP monoclonal antibodies, which can control your pain without risking medication overuse headaches. Injections of onabotulinumtoxinA (Botox) may also be beneficial in reducing the frequency and severity of your headaches.    Non-medication Treatments    Non-medication strategies may also be used alongside traditional treatments. Cognitive behavioral therapy (CBT), for example, teaches ways to cope  with headaches and promote healthy lifestyle habits.     Complementary or alternative therapies can also provide relief. Acupuncture, biofeedback, and certain herbs, vitamins, and minerals have been linked to headache relief. However, it's important to discuss these with your healthcare provider to understand the benefits and risks, as not all have been scientifically proven as effective headache treatments.    Coping and Support    Engaging with support groups or talking to others who have experienced similar situations can be helpful. Your healthcare provider may be able to recommend local groups, or you can reach out to organizations like the [employer].    Preparation for Your Appointment    When preparing for your appointment, keeping a headache diary can be very beneficial. This should detail your symptoms, lifestyle habits, and medication use. Prepare questions in advance to ask your healthcare provider, and don't hesitate to bring up any concerns. The more your provider knows about your headaches and medication use, the better they can assist you.    In the meantime, continue taking your medication as directed by your healthcare provider and maintain healthy lifestyle habits. This can include good sleep hygiene, regular exercise, and a balanced diet. Make sure to avoid any known headache triggers.    Remember, medication overuse headaches can be managed and overcome with the right care and treatment plan. If you suspect you're suffering from this condition, don't hesitate to reach out to a healthcare provider for support and guidance.

## 2024-06-07 NOTE — ASSESSMENT & PLAN NOTE
"Wt Readings from Last 6 Encounters:   06/07/24 111.8 kg (246 lb 7.6 oz)   11/13/23 108.4 kg (239 lb)   08/31/23 109.8 kg (242 lb)   07/14/23 109.7 kg (241 lb 13.5 oz)   07/14/23 109.8 kg (242 lb)   03/22/23 110.8 kg (244 lb 4.3 oz)     Estimated body mass index is 45.08 kg/m² as calculated from the following:    Height as of this encounter: 5' 2" (1.575 m).    Weight as of this encounter: 111.8 kg (246 lb 7.6 oz).    Therapeutic lifestyle changes encouraged.   "

## 2024-06-11 ENCOUNTER — TELEPHONE (OUTPATIENT)
Dept: INTERNAL MEDICINE | Facility: CLINIC | Age: 44
End: 2024-06-11
Payer: COMMERCIAL

## 2024-06-11 NOTE — TELEPHONE ENCOUNTER
----- Message from Kiki Joseph sent at 6/11/2024 11:40 AM CDT -----  Contact: self  Pt is asking for an return call in reference to medicaton lisdexamfetamine (VYVANSE) 30 MG capsule states medication is too expensive and is needing an alternate medication called into pharmacy or suggestion  ,please call back at [phone] Thx CJ

## 2024-07-03 ENCOUNTER — TELEPHONE (OUTPATIENT)
Dept: INTERNAL MEDICINE | Facility: CLINIC | Age: 44
End: 2024-07-03
Payer: COMMERCIAL

## 2024-07-05 ENCOUNTER — TELEPHONE (OUTPATIENT)
Dept: INTERNAL MEDICINE | Facility: CLINIC | Age: 44
End: 2024-07-05
Payer: COMMERCIAL

## 2024-07-05 ENCOUNTER — OFFICE VISIT (OUTPATIENT)
Dept: INTERNAL MEDICINE | Facility: CLINIC | Age: 44
End: 2024-07-05
Payer: COMMERCIAL

## 2024-07-05 DIAGNOSIS — E66.01 CLASS 3 SEVERE OBESITY DUE TO EXCESS CALORIES WITH SERIOUS COMORBIDITY AND BODY MASS INDEX (BMI) OF 45.0 TO 49.9 IN ADULT: Chronic | ICD-10-CM

## 2024-07-05 DIAGNOSIS — F33.0 RECURRENT MILD MAJOR DEPRESSIVE DISORDER WITH ANXIETY: Chronic | ICD-10-CM

## 2024-07-05 DIAGNOSIS — F41.9 RECURRENT MILD MAJOR DEPRESSIVE DISORDER WITH ANXIETY: Chronic | ICD-10-CM

## 2024-07-05 DIAGNOSIS — K21.9 GASTROESOPHAGEAL REFLUX DISEASE WITHOUT ESOPHAGITIS: Chronic | ICD-10-CM

## 2024-07-05 DIAGNOSIS — F50.81 BINGE EATING DISORDER: Primary | Chronic | ICD-10-CM

## 2024-07-05 PROCEDURE — G2211 COMPLEX E/M VISIT ADD ON: HCPCS | Mod: 95,,, | Performed by: FAMILY MEDICINE

## 2024-07-05 PROCEDURE — 1159F MED LIST DOCD IN RCRD: CPT | Mod: CPTII,95,, | Performed by: FAMILY MEDICINE

## 2024-07-05 PROCEDURE — 3044F HG A1C LEVEL LT 7.0%: CPT | Mod: CPTII,95,, | Performed by: FAMILY MEDICINE

## 2024-07-05 PROCEDURE — 99214 OFFICE O/P EST MOD 30 MIN: CPT | Mod: 95,,, | Performed by: FAMILY MEDICINE

## 2024-07-05 PROCEDURE — 1160F RVW MEDS BY RX/DR IN RCRD: CPT | Mod: CPTII,95,, | Performed by: FAMILY MEDICINE

## 2024-07-05 RX ORDER — LISDEXAMFETAMINE DIMESYLATE 30 MG/1
30 CAPSULE ORAL EVERY MORNING
Qty: 30 CAPSULE | Refills: 0 | Status: SHIPPED | OUTPATIENT
Start: 2024-08-30

## 2024-07-05 RX ORDER — ESCITALOPRAM OXALATE 10 MG/1
10 TABLET ORAL DAILY
Qty: 90 TABLET | Refills: 0 | Status: SHIPPED | OUTPATIENT
Start: 2024-07-05

## 2024-07-05 RX ORDER — LISDEXAMFETAMINE DIMESYLATE 30 MG/1
30 CAPSULE ORAL EVERY MORNING
Qty: 30 EACH | Refills: 0 | Status: SHIPPED | OUTPATIENT
Start: 2024-07-05

## 2024-07-05 RX ORDER — LISDEXAMFETAMINE DIMESYLATE 30 MG/1
30 CAPSULE ORAL EVERY MORNING
Qty: 30 CAPSULE | Refills: 0 | Status: SHIPPED | OUTPATIENT
Start: 2024-08-02

## 2024-07-05 RX ORDER — PANTOPRAZOLE SODIUM 40 MG/1
40 TABLET, DELAYED RELEASE ORAL DAILY
Qty: 90 TABLET | Refills: 3 | Status: SHIPPED | OUTPATIENT
Start: 2024-07-05 | End: 2025-07-05

## 2024-07-05 NOTE — PROGRESS NOTES
TELEMEDICINE VIRTUAL VIDEO VISIT  7/5/24  1:20 PM CDT    Visit Type: Audiovisual    Patient's Location: [place] represents that they are located within the state of [place].    History of Present Illness    Gina presents today for follow up.    She reports that in the first 1.5 weeks of starting Vyvanse, she experienced a complete lack of appetite and desire to eat. When she did attempt to eat something, she could not consume much food, which she felt was the intended effect of the medication to curb binge eating urges. She denies any recent binge eating episodes since starting Vyvanse.    She is taking Lexapro 10mg daily which is helping with her anxiety and stress levels. She reports feeling overwhelmed only a few days since the last visit, compared to more frequent overwhelming feelings previously.    She is currently taking Vyvanse for binge eating disorder. She had insurance coverage and cost concerns regarding Vyvanse. The generic version was on backorder, so she decided to pay for the brand name medication for one month. Her insurance's preferred drug list shows Vyvanse is covered but expensive. The [facility] cash [facility]. Most patients are not having issues getting the generic Vyvanse with short wait times. For Pantoprazole, she was ordered a 90-day supply but was only receiving 30 days at a time from [facility]. She has transferred all her prescriptions to [facility] for convenience. She is also using an OTC Vitamin D supplement.       Review of Systems   Constitutional:  Negative for activity change and unexpected weight change.   HENT:  Negative for hearing loss, rhinorrhea and trouble swallowing.    Eyes:  Negative for discharge and visual disturbance.   Respiratory:  Negative for chest tightness and wheezing.    Cardiovascular:  Negative for chest pain and palpitations.   Gastrointestinal:  Negative for blood in stool, constipation, diarrhea and vomiting.    Endocrine: Negative for polydipsia and polyuria.   Genitourinary:  Negative for difficulty urinating, dysuria, hematuria and menstrual problem.   Musculoskeletal:  Negative for arthralgias, joint swelling and neck pain.   Neurological:  Negative for weakness.   Psychiatric/Behavioral:  Negative for confusion and dysphoric mood.        Assessment & Plan     Vyvanse (lisdexamfetamine) appears to be helping subjectively with binge eating disorder   Escitalopram 10 mg daily seems to be an effective regimen for managing depression and anxiety   Need to monitor weight while on Vyvanse  Z79.899 OTHER LONG TERM (CURRENT) DRUG THERAPY:   Prescribed Vyvanse (lisdexamfetamine) generic with 3 30-day supplies, with 1 fillable now, 1 fillable in 4 weeks, and 1 fillable 4 weeks after that.   Continued escitalopram (Lexapro) 10 mg daily and pantoprazole (Protonix), providing a 90 day supply.   Transferred all prescriptions to [facility] pharmacy in [place] on [address].   The patient is currently taking Lexapro escitalopram 10 mg daily, Vyvanse lisdexamfetamine, pantoprazole Protonix, and OTC vitamin D.   Lexapro escitalopram 10 mg seems to be effective for managing depression and anxiety, while Vyvanse is subjectively helping with binge eating disorder.   Refilled escitalopram Lexapro and pantoprazole Protonix prescriptions, and sent Vyvanse lisdexamfetamine prescription along with other prescriptions to [facility] pharmacy.   Advised the patient to continue Lexapro escitalopram 10 mg daily, Vyvanse lisdexamfetamine, pantoprazole Protonix, and OTC vitamin D.  E55.9 VITAMIN D DEFICIENCY, UNSPECIFIED:   The patient is currently taking OTC vitamin D.   Advised the patient to continue taking OTC vitamin D.  F32.9 MAJOR DEPRESSIVE DISORDER, SINGLE EPISODE, UNSPECIFIED:   Continued escitalopram (Lexapro) 10 mg daily.   The patient is currently taking Lexapro escitalopram 10 mg daily, which seems to be effective for managing  depression.   Refilled escitalopram Lexapro prescription.   Advised the patient to continue taking Lexapro escitalopram 10 mg daily.  F50.81 BINGE EATING DISORDER:   The patient is currently taking Vyvanse lisdexamfetamine.   Reported having absolutely no desire to eat whatsoever in the first week and a half of starting Vyvanse and could not eat a whole lot even when tried to eat something.   Vyvanse is subjectively helping with binge eating disorder.   Sent Vyvanse lisdexamfetamine prescription to [facility] pharmacy.   Advised the patient to continue taking Vyvanse lisdexamfetamine.  F41.1 GENERALIZED ANXIETY DISORDER:   The patient is currently taking Lexapro escitalopram 10 mg daily.   Reported anxiety and stress levels have been at a medium level, with a few days of feeling very overwhelmed and about 3-4 days of being pretty rattled.   Lexapro escitalopram 10 mg seems to be effective for managing anxiety, and things have been much better on the whole.   Refilled escitalopram Lexapro prescription.   Advised the patient to continue taking Lexapro escitalopram 10 mg daily.  LIFESTYLE CHANGES:   Follow up for a virtual video visit in late September.   Contact the office if any issues arise before the next appointment.       1. Binge eating disorder  -     lisdexamfetamine (VYVANSE) 30 MG capsule; Take 1 capsule (30 mg total) by mouth every morning.  Dispense: 30 each; Refill: 0  -     lisdexamfetamine (VYVANSE) 30 MG capsule; Take 1 capsule (30 mg total) by mouth every morning.  Dispense: 30 capsule; Refill: 0  -     lisdexamfetamine (VYVANSE) 30 MG capsule; Take 1 capsule (30 mg total) by mouth every morning.  Dispense: 30 capsule; Refill: 0    2. Recurrent mild major depressive disorder with anxiety  -     lisdexamfetamine (VYVANSE) 30 MG capsule; Take 1 capsule (30 mg total) by mouth every morning.  Dispense: 30 each; Refill: 0  -     EScitalopram oxalate (LEXAPRO) 10 MG tablet; Take 1 tablet (10 mg total) by mouth  once daily.  Dispense: 90 tablet; Refill: 0  -     lisdexamfetamine (VYVANSE) 30 MG capsule; Take 1 capsule (30 mg total) by mouth every morning.  Dispense: 30 capsule; Refill: 0  -     lisdexamfetamine (VYVANSE) 30 MG capsule; Take 1 capsule (30 mg total) by mouth every morning.  Dispense: 30 capsule; Refill: 0    3. Class 3 severe obesity due to excess calories with serious comorbidity and body mass index (BMI) of 45.0 to 49.9 in adult    4. Gastroesophageal reflux disease without esophagitis  -     pantoprazole (PROTONIX) 40 MG tablet; Take 1 tablet (40 mg total) by mouth once daily.  Dispense: 90 tablet; Refill: 3    No other significant complaints or concerns were reported.  Today's visit involved the intricate management of episodic problem(s) and the ongoing care for the patient's serious or complex condition(s) listed above, reflecting the inherent complexity of providing longitudinal, comprehensive evaluation and management as the central hub for the patient's primary care services.  There were no vitals filed for this visit.  Physical Exam    Constitutional: No acute distress. Normal appearance. Not ill-appearing.  Pulmonary: Pulmonary effort is normal. No respiratory distress.  Skin: Skin is not jaundiced.  Neurological: Alert. Mental status is at baseline.  Psychiatric: Mood normal. Behavior normal. Thought content normal.         This note was generated with the assistance of ambient listening technology. Verbal consent was obtained by the patient and accompanying visitor(s) for the recording of patient appointment to facilitate this note. I attest to having reviewed and edited the generated note for accuracy, though some syntax or spelling errors may persist. Please contact the author of this note for any clarification.      I spent a total of 30 minutes today evaluating and managing this patient for this encounter.  This includes face to face time and non-face to face time preparing to see the patient  "(eg, review of tests), obtaining and/or reviewing separately obtained history, documenting clinical information in the electronic or other health record, independently interpreting results and communicating results to the patient/family/caregiver, or care coordinator. This time was exclusive of any separately billable procedures for this patient and exclusive of time spent treating any other patient.    Documentation entered by me for this encounter may have been done in part using speech-recognition technology. Although I have made an effort to ensure accuracy, "sound like" errors may exist and should be interpreted in context.    Each patient to whom medical services are provided by telemedicine is: (1) informed of the relationship between the physician and patient and the respective role of any other health care provider with respect to management of the patient; and (2) notified that he or she may decline to receive medical services by telemedicine and may withdraw from such care at any time.   "

## 2024-07-05 NOTE — TELEPHONE ENCOUNTER
----- Message from Praveena Robertson sent at 7/5/2024  7:44 AM CDT -----  Contact: Gina Driscoll's internet is out and can not do a virtual visit. Please callback [phone] to assist

## 2024-08-02 ENCOUNTER — E-VISIT (OUTPATIENT)
Dept: INTERNAL MEDICINE | Facility: CLINIC | Age: 44
End: 2024-08-02
Payer: COMMERCIAL

## 2024-08-02 DIAGNOSIS — F50.81 BINGE EATING DISORDER: Primary | Chronic | ICD-10-CM

## 2024-08-02 PROCEDURE — 99422 OL DIG E/M SVC 11-20 MIN: CPT | Mod: ,,, | Performed by: FAMILY MEDICINE
